# Patient Record
Sex: FEMALE | Race: ASIAN | Employment: PART TIME | URBAN - METROPOLITAN AREA
[De-identification: names, ages, dates, MRNs, and addresses within clinical notes are randomized per-mention and may not be internally consistent; named-entity substitution may affect disease eponyms.]

---

## 2017-10-20 ENCOUNTER — OFFICE VISIT (OUTPATIENT)
Dept: INTERNAL MEDICINE CLINIC | Age: 57
End: 2017-10-20

## 2017-10-20 ENCOUNTER — HOSPITAL ENCOUNTER (OUTPATIENT)
Dept: GENERAL RADIOLOGY | Age: 57
Discharge: HOME | End: 2017-10-20
Payer: COMMERCIAL

## 2017-10-20 VITALS
RESPIRATION RATE: 18 BRPM | OXYGEN SATURATION: 97 % | HEIGHT: 64 IN | DIASTOLIC BLOOD PRESSURE: 58 MMHG | WEIGHT: 147 LBS | TEMPERATURE: 98.4 F | SYSTOLIC BLOOD PRESSURE: 135 MMHG | BODY MASS INDEX: 25.1 KG/M2 | HEART RATE: 60 BPM

## 2017-10-20 DIAGNOSIS — R51.9 FREQUENT HEADACHES: ICD-10-CM

## 2017-10-20 DIAGNOSIS — M25.511 CHRONIC RIGHT SHOULDER PAIN: ICD-10-CM

## 2017-10-20 DIAGNOSIS — E78.00 HYPERCHOLESTEREMIA: ICD-10-CM

## 2017-10-20 DIAGNOSIS — Z13.228 SCREENING FOR ENDOCRINE, NUTRITIONAL, METABOLIC AND IMMUNITY DISORDER: ICD-10-CM

## 2017-10-20 DIAGNOSIS — Z71.84 COUNSELING ABOUT TRAVEL: ICD-10-CM

## 2017-10-20 DIAGNOSIS — M54.2 CERVICALGIA: ICD-10-CM

## 2017-10-20 DIAGNOSIS — M54.2 CERVICALGIA: Primary | ICD-10-CM

## 2017-10-20 DIAGNOSIS — Z13.29 SCREENING FOR ENDOCRINE, NUTRITIONAL, METABOLIC AND IMMUNITY DISORDER: ICD-10-CM

## 2017-10-20 DIAGNOSIS — G89.29 CHRONIC RIGHT SHOULDER PAIN: ICD-10-CM

## 2017-10-20 DIAGNOSIS — Z13.21 SCREENING FOR ENDOCRINE, NUTRITIONAL, METABOLIC AND IMMUNITY DISORDER: ICD-10-CM

## 2017-10-20 DIAGNOSIS — Z11.59 ENCOUNTER FOR HEPATITIS C SCREENING TEST FOR LOW RISK PATIENT: ICD-10-CM

## 2017-10-20 DIAGNOSIS — Z13.0 SCREENING FOR ENDOCRINE, NUTRITIONAL, METABOLIC AND IMMUNITY DISORDER: ICD-10-CM

## 2017-10-20 DIAGNOSIS — I10 HTN (HYPERTENSION), BENIGN: ICD-10-CM

## 2017-10-20 PROCEDURE — 73030 X-RAY EXAM OF SHOULDER: CPT

## 2017-10-20 PROCEDURE — 72050 X-RAY EXAM NECK SPINE 4/5VWS: CPT

## 2017-10-20 RX ORDER — DICLOFENAC SODIUM 10 MG/G
2 GEL TOPICAL EVERY 6 HOURS
Qty: 100 G | Refills: 2 | Status: SHIPPED | OUTPATIENT
Start: 2017-10-20 | End: 2019-12-13 | Stop reason: ALTCHOICE

## 2017-10-20 RX ORDER — ATOVAQUONE AND PROGUANIL HYDROCHLORIDE 250; 100 MG/1; MG/1
TABLET, FILM COATED ORAL
Qty: 2 TAB | Refills: 0 | Status: SHIPPED | OUTPATIENT
Start: 2017-10-20 | End: 2019-12-13 | Stop reason: ALTCHOICE

## 2017-10-20 NOTE — PROGRESS NOTES
1. Have you been to the ER, urgent care clinic since your last visit? Hospitalized since your last visit? No    2. Have you seen or consulted any other health care providers outside of the [address] [place] [address] since your last visit? Include any pap smears or colon screening.  No     Pt is here for   Chief Complaint   Patient presents with    New Patient     pt is here to establish care    Hypertension    Immunization/Injection     pt states she's going to the [facility] and needs the necessary immunizations for this sent to [facility]          Pt denies pain at this time

## 2017-10-20 NOTE — MR AVS SNAPSHOT
Visit Information Date & Time Provider Department Dept. Phone Encounter #  
 10/20/2017  9:40 AM Gerhard Garcia NP [id number] [phone] [id number] Follow-up Instructions Return in about 4 weeks (around 11/17/2017) for htn, NO MED. Your Appointments 11/17/2017 10:20 AM  
ROUTINE CARE with Gerhard Garcia NP  
[facility] Note: htn , No med [facility] [id number]  
[phone]  
  
   
 [address] [place] Upcoming Health Maintenance Date Due  
 PAP AKA CERVICAL CYTOLOGY 11/19/2017* FOBT Q 1 YEAR AGE 50-75 11/19/2017* BREAST CANCER SCRN MAMMOGRAM 10/20/2019 DTaP/Tdap/Td series (2 - Td) 10/20/2027 *Topic was postponed. The date shown is not the original due date. Allergies as of 10/20/2017  Review Complete On: 10/20/2017 By: Zainab Maloney. TRENT Yuen No Known Allergies Current Immunizations  Reviewed on 2/27/2014 No immunizations on file. Not reviewed this visit You Were Diagnosed With   
  
 Codes Comments Cervicalgia    -  Primary ICD-10-CM: M54.2 ICD-9-CM: 723.1 HTN (hypertension), benign     ICD-10-CM: I10 
ICD-9-CM: 401.1 Hypercholesteremia     ICD-10-CM: E78.00 ICD-9-CM: 272.0 Frequent headaches     ICD-10-CM: R51 ICD-9-CM: 784.0 Screening for endocrine, nutritional, metabolic and immunity disorder     ICD-10-CM: Z13.29, Z13.21, Z13.228, Z13.0 ICD-9-CM: V77.99 Chronic right shoulder pain     ICD-10-CM: M25.511, G89.29 ICD-9-CM: 719.41, 338.29 Encounter for hepatitis C screening test for low risk patient     ICD-10-CM: Z11.59 
ICD-9-CM: V73.89 Vitals BP Pulse Temp Resp Height(growth percentile) Weight(growth percentile) 135/58 (BP 1 Location: Right arm, BP Patient Position: Sitting) 60 98.4 °F (36.9 °C) (Oral) 18 5' 4\" (1.626 m) 147 lb (66.7 kg) SpO2 BMI OB Status Smoking Status 97% 25.23 kg/m2 Menopause Never Smoker Vitals History BMI and BSA Data Body Mass Index Body Surface Area  
 25.23 kg/m 2 1.74 m 2 Preferred Pharmacy Pharmacy Name Phone [facility] [address] [place] [address], [address] [place] [address],[address] [phone] Your Updated Medication List  
  
   
This list is accurate as of: 10/20/17 10:30 AM.  Always use your most recent med list.  
  
  
  
  
 aspirin 81 mg chewable tablet Take 81 mg by mouth daily. diclofenac 1 % Gel Commonly known as:  VOLTAREN Apply 2 g to affected area every six (6) hours. multivitamin tablet Commonly known as:  ONE A DAY Take 1 Tab by mouth daily. VITAMIN C 500 mg tablet Generic drug:  ascorbic acid (vitamin C) Take 500 mg by mouth daily. Prescriptions Sent to Pharmacy Refills  
 diclofenac (VOLTAREN) 1 % gel 2 Sig: Apply 2 g to affected area every six (6) hours. Class: Normal  
 Pharmacy: [id number] [place] [address], [address] [place] [address],[address] Ph #: [phone] Route: Topical  
  
We Performed the Following CBC WITH AUTOMATED DIFF [16136 CPT(R)] HEPATITIS PANEL, ACUTE [17891 CPT(R)] LIPID PANEL [42608 CPT(R)] METABOLIC PANEL, COMPREHENSIVE [62217 CPT(R)] TSH 3RD GENERATION [65500 CPT(R)] VITAMIN D, 25 HYDROXY [id number] CPT(R)] Follow-up Instructions Return in about 4 weeks (around 11/17/2017) for htn, NO MED. To-Do List   
 10/20/2017 Imaging:  XR SHOULDER RT AP/LAT MIN 2 V   
  
 10/20/2017 Imaging:  XR SPINE CERV 4 OR 5 V Patient Instructions Also try GLUCOSAMINE with CHONDROITIN to help with your arthritis. Learning About Colonoscopy What is a colonoscopy? A colonoscopy is a test (also called a procedure) that lets a doctor look inside your large intestine.  The doctor uses a thin, lighted tube called a colonoscope. The doctor uses it to look for small growths called polyps, colon or rectal cancer (colorectal cancer), or other problems like bleeding. During the procedure, the doctor can take samples of tissue. The samples can then be checked for cancer or other conditions. The doctor can also take out polyps. How is colonoscopy done? This procedure is done in a doctor's office or a clinic or hospital. You will get medicine to help you relax and not feel pain. Some people find that they do not remember having the test because of the medicine. The doctor gently moves the colonoscope, or scope, through the colon. The scope is also a small video camera. It lets the doctor see the colon and take pictures. A colonoscopy usually takes 30 to 45 minutes. It may take longer if the doctor has to remove polyps. How do you prepare for the procedure? You need to clean out your colon before the procedure so the doctor can see all of your colon. You may start the cleaning process a day or two before the test. This depends on which \"colon prep\" your doctor recommends. To clean your colon, you stop eating solid foods and drink only clear liquids. You can have water, tea, coffee, clear juices, clear broths, flavored ice pops, and gelatin (such as Jell-O). Do not drink anything red or purple, such as grape juice or fruit punch. And do not eat red or purple foods, such as grape ice pops or cherry gelatin. The day or night before the procedure, you drink a large amount of a special liquid. This causes loose, frequent stools. You will go to the bathroom a lot. It is very important to drink all of the colon prep liquid. If you have problems drinking the liquid, call your doctor. For many people, the prep is worse than the test. It may be uncomfortable, and you may feel hungry on the clear liquid diet. Some people do not go to work or do their usual activities on the day of the prep. Arrange to have someone take you home after the test. 
What can you expect after a colonoscopy? The nurses will watch you for 1 to 2 hours until the medicines wear off. Then you can go home. You will need a ride. Your doctor will tell you when you can eat and do your usual activities. Your doctor will talk to you about when you will need your next colonoscopy. The results of your test and your risk for colorectal cancer will help your doctor decide how often you need to be checked. Follow-up care is a key part of your treatment and safety. Be sure to make and go to all appointments, and call your doctor if you are having problems. It's also a good idea to know your test results and keep a list of the medicines you take. Where can you learn more? Go to http://tommy-manolo.info/. Enter R305 in the search box to learn more about \"Learning About Colonoscopy. \" Current as of: July 26, 2016 Content Version: 11.3 © 9544-9428 Vertical Acuity. Care instructions adapted under license by Softheon (which disclaims liability or warranty for this information). If you have questions about a medical condition or this instruction, always ask your healthcare professional. [place] [address] any warranty or liability for your use of this information. Mammogram: About This Test 
What is it? A mammogram is an X-ray of the breast that is used to screen for breast cancer. This test can find tumors that are too small for you or your doctor to feel. Cancer is most easily treated and cured when it is found at an early stage. Why is this test done? A mammogram is done to: 
· Look for breast cancer in women who don't have symptoms. · Find breast cancer in women who have symptoms.  Symptoms of breast cancer may include a lump or thickening in the breast, nipple discharge, or dimpling of the skin on one area of the breast. 
 · Find an area of suspicious breast tissue to remove for an exam under a microscope (biopsy). How can you prepare for the test? 
· Tell your doctor if you: ¨ Are or might be pregnant. ¨ Are breastfeeding. ¨ Have breast implants. ¨ Have previously had a breast biopsy. · On the day of the test, don't use any deodorant, perfume, powders, or ointments. What happens before the test? 
· You will need to take off any jewelry that might interfere with the X-ray pictures. · You will need to take off your clothes above the waist. 
· You will be given a cloth or paper gown to use during the test. 
What happens during the test? 
· You usually stand during a mammogram. 
· One at a time, your breasts will be placed on a flat plate that contains the X-ray film. · Another plate is then pressed firmly against your breast to help flatten out the breast tissue. You may be asked to lift your arm. · For a few seconds while the X-ray picture is being taken, you will need to hold your breath. · At least two pictures are taken of each breast. One is taken from the top and one from the side. What else should you know about the test? 
· The X-ray plate will feel cold when you place your breast on it. Having your breasts flattened and squeezed isn't comfortable. But it is necessary to flatten out the breast tissue to get the best pictures. · Mammograms do not prevent breast cancer or reduce a woman's risk of developing cancer. · Most things that are found during a mammogram are not breast cancer. How long does the test take? · The test will take about 10 to 15 minutes. You may be in the clinic for up to an hour. What happens after the test? 
· You will probably be able to go home right away. · You can go back to your usual activities right away. Follow-up care is a key part of your treatment and safety.  Be sure to make and go to all appointments, and call your doctor if you are having problems. It's also a good idea to keep a list of the medicines you take. Ask your doctor when you can expect to have your test results. Where can you learn more? Go to http://tommy-manolo.info/. Enter G098 in the search box to learn more about \"Mammogram: About This Test.\" Current as of: July 26, 2016 Content Version: 11.3 © 2006-2017 Root Orange. Care instructions adapted under license by Hometica (which disclaims liability or warranty for this information). If you have questions about a medical condition or this instruction, always ask your healthcare professional. [place] [address] any warranty or liability for your use of this information. Neck Arthritis: Exercises Your Care Instructions Here are some examples of typical rehabilitation exercises for your condition. Start each exercise slowly. Ease off the exercise if you start to have pain. Your doctor or physical therapist will tell you when you can start these exercises and which ones will work best for you. How to do the exercises Neck stretches to the side 1. This stretch works best if you keep your shoulder down as you lean away from it. To help you remember to do this, start by relaxing your shoulders and lightly holding on to your thighs or your chair. 2. Tilt your head toward your shoulder and hold for 15 to 30 seconds. Let the weight of your head stretch your muscles. 3. Repeat 2 to 4 times toward each shoulder. Chin tuck 1. Lie on the floor with a rolled-up towel under your neck. Your head should be touching the floor. 2. Slowly bring your chin toward your chest. 
3. Hold for a count of 6, and then relax for up to 10 seconds. 4. Repeat 8 to 12 times. Active cervical rotation 1. Sit in a firm chair, or stand up straight. 2. Keeping your chin level, turn your head to the right, and hold for 15 to 30 seconds. 3. Turn your head to the left and hold for 15 to 30 seconds. 4. Repeat 2 to 4 times to each side. Shoulder blade squeeze 1. While standing, squeeze your shoulder blades together. 2. Do not raise your shoulders up as you are squeezing. 3. Hold for 6 seconds. 4. Repeat 8 to 12 times. Shoulder rolls 1. Sit comfortably with your feet shoulder-width apart. You can also do this exercise standing up. 2. Roll your shoulders up, then back, and then down in a smooth, circular motion. 3. Repeat 2 to 4 times. Follow-up care is a key part of your treatment and safety. Be sure to make and go to all appointments, and call your doctor if you are having problems. It's also a good idea to know your test results and keep a list of the medicines you take. Where can you learn more? Go to http://tommy-manolo.info/. Enter W913 in the search box to learn more about \"Neck Arthritis: Exercises. \" Current as of: March 21, 2017 Content Version: 11.3 © 8366-8965 Etelos. Care instructions adapted under license by Addashop (which disclaims liability or warranty for this information). If you have questions about a medical condition or this instruction, always ask your healthcare professional. [place] [address] any warranty or liability for your use of this information. Arthritis: Care Instructions Your Care Instructions Arthritis, also called osteoarthritis, is a breakdown of the cartilage that cushions your joints. When the cartilage wears down, your bones rub against each other. This causes pain and stiffness. Many people have some arthritis as they age. Arthritis most often affects the joints of the spine, hands, hips, knees, or feet. You can take simple measures to protect your joints, ease your pain, and help you stay active. Follow-up care is a key part of your treatment and safety.  Be sure to make and go to all appointments, and call your doctor if you are having problems. It's also a good idea to know your test results and keep a list of the medicines you take. How can you care for yourself at home? · Stay at a healthy weight. Being overweight puts extra strain on your joints. · Talk to your doctor or physical therapist about exercises that will help ease joint pain. ¨ Stretch. You may enjoy gentle forms of yoga to help keep your joints and muscles flexible. ¨ Walk instead of jog. Other types of exercise that are less stressful on the joints include riding a bicycle, swimming, wilian chi, or water exercise. ¨ Lift weights. Strong muscles help reduce stress on your joints. Stronger thigh muscles, for example, take some of the stress off of the knees and hips. Learn the right way to lift weights so you do not make joint pain worse. · Take your medicines exactly as prescribed. Call your doctor if you think you are having a problem with your medicine. · Take pain medicines exactly as directed. ¨ If the doctor gave you a prescription medicine for pain, take it as prescribed. ¨ If you are not taking a prescription pain medicine, ask your doctor if you can take an over-the-counter medicine. · Use a cane, crutch, walker, or another device if you need help to get around. These can help rest your joints. You also can use other things to make life easier, such as a higher toilet seat and padded handles on kitchen utensils. · Do not sit in low chairs, which can make it hard to get up. · Put heat or cold on your sore joints as needed. Use whichever helps you most. You also can take turns with hot and cold packs. ¨ Apply heat 2 or 3 times a day for 20 to 30 minutesusing a heating pad, hot shower, or hot packto relieve pain and stiffness. ¨ Put ice or a cold pack on your sore joint for 10 to 20 minutes at a time. Put a thin cloth between the ice and your skin. When should you call for help? Call your doctor now or seek immediate medical care if: 
· You have sudden swelling, warmth, or pain in any joint. · You have joint pain and a fever or rash. · You have such bad pain that you cannot use a joint. Watch closely for changes in your health, and be sure to contact your doctor if: 
· You have mild joint symptoms that continue even with more than 6 weeks of care at home. · You have stomach pain or other problems with your medicine. Where can you learn more? Go to http://tommy-manolo.info/. Enter P919 in the search box to learn more about \"Arthritis: Care Instructions. \" Current as of: November 28, 2016 Content Version: 11.3 © 3256-3082 Branded Online. Care instructions adapted under license by Prism Digital (which disclaims liability or warranty for this information). If you have questions about a medical condition or this instruction, always ask your healthcare professional. [place] [address] any warranty or liability for your use of this information. Low Sodium Diet (2,000 Milligram): Care Instructions Your Care Instructions Too much sodium causes your body to hold on to extra water. This can raise your blood pressure and force your heart and kidneys to work harder. In very serious cases, this could cause you to be put in the hospital. It might even be life-threatening. By limiting sodium, you will feel better and lower your risk of serious problems. The most common source of sodium is salt. People get most of the salt in their diet from canned, prepared, and packaged foods. Fast food and restaurant meals also are very high in sodium. Your doctor will probably limit your sodium to less than 2,000 milligrams (mg) a day. This limit counts all the sodium in prepared and packaged foods and any salt you add to your food. Follow-up care is a key part of your treatment and safety.  Be sure to make and go to all appointments, and call your doctor if you are having problems. It's also a good idea to know your test results and keep a list of the medicines you take. How can you care for yourself at home? Read food labels · Read labels on cans and food packages. The labels tell you how much sodium is in each serving. Make sure that you look at the serving size. If you eat more than the serving size, you have eaten more sodium. · Food labels also tell you the Percent Daily Value for sodium. Choose products with low Percent Daily Values for sodium. · Be aware that sodium can come in forms other than salt, including monosodium glutamate (MSG), sodium citrate, and sodium bicarbonate (baking soda). MSG is often added to Asian food. When you eat out, you can sometimes ask for food without MSG or added salt. Buy low-sodium foods · Buy foods that are labeled \"unsalted\" (no salt added), \"sodium-free\" (less than 5 mg of sodium per serving), or \"low-sodium\" (less than 140 mg of sodium per serving). Foods labeled \"reduced-sodium\" and \"light sodium\" may still have too much sodium. Be sure to read the label to see how much sodium you are getting. · Buy fresh vegetables, or frozen vegetables without added sauces. Buy low-sodium versions of canned vegetables, soups, and other canned goods. Prepare low-sodium meals · Cut back on the amount of salt you use in cooking. This will help you adjust to the taste. Do not add salt after cooking. One teaspoon of salt has about 2,300 mg of sodium. · Take the salt shaker off the table. · Flavor your food with garlic, lemon juice, onion, vinegar, herbs, and spices. Do not use soy sauce, lite soy sauce, steak sauce, onion salt, garlic salt, celery salt, mustard, or ketchup on your food. · Use low-sodium salad dressings, sauces, and ketchup. Or make your own salad dressings and sauces without adding salt. · Use less salt (or none) when recipes call for it.  You can often use half the salt a recipe calls for without losing flavor. Other foods such as rice, pasta, and grains do not need added salt. · Rinse canned vegetables, and cook them in fresh water. This removes somebut not allof the salt. · Avoid water that is naturally high in sodium or that has been treated with water softeners, which add sodium. Call your local water company to find out the sodium content of your water supply. If you buy bottled water, read the label and choose a sodium-free brand. Avoid high-sodium foods · Avoid eating: ¨ Smoked, cured, salted, and canned meat, fish, and poultry. ¨ Ham, lazcano, hot dogs, and luncheon meats. ¨ Regular, hard, and processed cheese and regular peanut butter. ¨ Crackers with salted tops, and other salted snack foods such as pretzels, chips, and salted popcorn. ¨ Frozen prepared meals, unless labeled low-sodium. ¨ Canned and dried soups, broths, and bouillon, unless labeled sodium-free or low-sodium. ¨ Canned vegetables, unless labeled sodium-free or low-sodium. ¨ Western Sangita fries, pizza, tacos, and other fast foods. ¨ Pickles, olives, ketchup, and other condiments, especially soy sauce, unless labeled sodium-free or low-sodium. Where can you learn more? Go to http://tommy-manolo.info/. Enter Y420 in the search box to learn more about \"Low Sodium Diet (2,000 Milligram): Care Instructions. \" Current as of: July 26, 2016 Content Version: 11.3 © 6750-5017 PFSweb. Care instructions adapted under license by Number 100 (which disclaims liability or warranty for this information). If you have questions about a medical condition or this instruction, always ask your healthcare professional. [address] any warranty or liability for your use of this information. Diclofenac (On the skin) Diclofenac (dye-KLOE-fen-ak) Treats actinic keratoses. Also treats pain and swelling caused by arthritis. This is an NSAID. Brand Name(s): DS Prep Derrell, DST Plus Derrell, DermacinRx [facility] Varco, Diclo Gel, Diclofex DC, Diclozor, Inflamma-K, Klofensaid II, Lexixryl, Lorvatus, NuDiclo SoluPAK, Pennsaid, Solaraze, Sure Result DSS Premium Pack, Voltaren Gel There may be other brand names for this medicine. When This Medicine Should Not Be Used: This medicine is not right for everyone. Do not use it if you had an allergic reaction to diclofenac, aspirin or another NSAID medication. How to Use This Medicine:  
Gel/Jelly, Liquid · Use your medicine as directed. There are several brands of this medicine. Make sure you understand how to use the brand you have been prescribed. Ask your doctor if you have any questions. · The Voltaren® gel comes with a dosing card to measure the correct dose. If you do not receive or misplace your dosing card, call your pharmacist to ask for a new one. · Use this medicine only on your skin. Rinse it off right away if it gets on a cut or scrape. Do not get the medicine in your eyes, nose, or mouth. · Wash your hands with soap and water before and after you use this medicine. · Apply a thin layer of the medicine to the affected area. Rub it in gently. · Do not shower, bathe, or wash the affected area for at least 30 minutes after you apply Pennsaid® or Solaraze® or 1 hour after you apply Voltaren®. · Wait until the medicine dries before you cover the treated skin with gloves or clothing. Do not let the treated skin touch any other person's skin until the medicine is completely dry. · Do not use external heat or bandages on the treated skin or joint. · This medicine should come with a Medication Guide. Ask your pharmacist for a copy if you do not have one. · Missed dose: Apply a dose as soon as you can. If it is almost time for your next dose, wait until then and apply a regular dose. Do not apply extra medicine to make up for a missed dose. · Store the medicine in a closed container at room temperature, away from heat, moisture, and direct light. Drugs and Foods to Avoid: Ask your doctor or pharmacist before using any other medicine, including over-the-counter medicines, vitamins, and herbal products. · Do not use any other NSAID unless your doctor says it is okay. Some other NSAIDs are aspirin, diflunisal, ibuprofen, naproxen, or salsalate. · Some foods and medicines can affect how diclofenac works. Tell your doctor if you are using any of the following: ¨ Acetaminophen, cyclosporine, digoxin, lithium, methotrexate, pemetrexed ¨ Blood pressure medicine ¨ Blood thinner (including warfarin) ¨ Diuretic (water pill) ¨ Medicine to treat depression ¨ Steroids (including dexamethasone, hydrocortisone, methylprednisolone, prednisolone, prednisone) · Do not put cosmetics or skin care products on the treated skin. You may use sunscreen, insect repellant, lotion, or other topical medicines after using Pennsaid®. However, wait until the medicine is completely dry before you apply anything else. Warnings While Using This Medicine: · Tell your doctor if you are pregnant. It is not safe to use this medicine during the later part of pregnancy. · Tell your doctor if you are breastfeeding, or if you have kidney disease, liver disease, asthma, bleeding problems, heart failure, high blood pressure, other heart or blood vessel problems, a recent heart attack, or a history of stomach ulcers or bleeding. Tell your doctor if you drink alcohol. · This medicine may cause the following problems: 
¨ Higher risk of blood clot, heart attack, heart failure, or stroke ¨ Bleeding in your stomach or intestines ¨ Liver problems ¨ Kidney problems and high potassium levels ¨ Serious skin reactions · This medicine may make your skin more sensitive to sunlight. Wear sunscreen. Do not use sunlamps or tanning beds. · Your doctor will do lab tests at regular visits to check on the effects of this medicine. Keep all appointments. · Keep all medicine out of the reach of children. Never share your medicine with anyone. Possible Side Effects While Using This Medicine:  
Call your doctor right away if you notice any of these side effects: · Allergic reaction: Itching or hives, swelling in your face or hands, swelling or tingling in your mouth or throat, chest tightness, trouble breathing · Blistering, peeling, or red skin rash · Bloody or black, tarry stools, severe stomach pain, vomiting blood or material that looks like coffee grounds · Change in how much or how often you urinate · Chest pain that may spread to your arms, jaw, back, or neck, trouble breathing, unusual sweating, faintness · Dark urine or pale stools, nausea, vomiting, loss of appetite, stomach pain, yellow skin or eyes · Numbness or weakness in your arm or leg, or on one side of your body, pain in your lower leg, sudden or severe headache, or problems with vision, speech, or walking · Rapid weight gain, swelling in your hands, ankles, or feet · Unusual bleeding, bruising, or weakness If you notice these less serious side effects, talk with your doctor: · Dry, flaky, or scaly skin · Mild headache · Mild skin rash, itching, or redness If you notice other side effects that you think are caused by this medicine, tell your doctor. Call your doctor for medical advice about side effects. You may report side effects to FDA at 3-738-KFE-8993 © 2017 [facility] Information is for End User's use only and may not be sold, redistributed or otherwise used for commercial purposes. The above information is an  only. It is not intended as medical advice for individual conditions or treatments. Talk to your doctor, nurse or pharmacist before following any medical regimen to see if it is safe and effective for you. Introducing [facility]! Florecita Tavares introduces [employer] patient portal. Now you can access parts of your medical record, email your doctor's office, and request medication refills online. 1. In your internet browser, go to [email or web address] 2. Click on the First Time User? Click Here link in the Sign In box. You will see the New Member Sign Up page. 3. Enter your [employer] Access Code exactly as it appears below. You will not need to use this code after youve completed the sign-up process. If you do not sign up before the expiration date, you must request a new code. · [employer] Access Code: [email or web address] Expires: 1/18/2018  9:32 AM 
 
4. Enter the last four digits of your Social Security Number (xxxx) and Date of Birth (mm/dd/yyyy) as indicated and click Submit. You will be taken to the next sign-up page. 5. Create a [employer] ID. This will be your [employer] login ID and cannot be changed, so think of one that is secure and easy to remember. 6. Create a [employer] password. You can change your password at any time. 7. Enter your Password Reset Question and Answer. This can be used at a later time if you forget your password. 8. Enter your e-mail address. You will receive e-mail notification when new information is available in [address] 9. Click Sign Up. You can now view and download portions of your medical record. 10. Click the Download Summary menu link to download a portable copy of your medical information. If you have questions, please visit the Frequently Asked Questions section of the [employer] website. Remember, [employer] is NOT to be used for urgent needs. For medical emergencies, dial 911. Now available from your iPhone and Android! Please provide this summary of care documentation to your next provider. Your primary care clinician is listed as Phys Other. If you have any questions after today's visit, please call [phone].

## 2017-10-20 NOTE — PROGRESS NOTES
Garrett Bennett is a 59 y.o. female and presents with New Patient (pt is here to establish care); Hypertension; and Immunization/Injection (pt states she's going to the [facility] and needs the necessary immunizations for this sent to [facility])    Subjective:  Pt here to establish care,  is a pt here also. Concerned for pain in neck, head, and shoulder. Ongoing for years, however more persistent lately. No fall or head injury reported. Denies numbness, tingling,and blurring vision. Alleviated some with time and use of OTC pain meds. Worse in the morning upon waking with stiffness. Annell Lisa on right shoulder many years ago with reported dislocation and has issues with right shoulder intermittently with overhead reaching since. No imaging in past for neck or shoulder. Hypertension Review:  The patient has hypertension  Diet and Lifestyle: generally does try to follow a  low sodium diet, exercises sporadically   Home BP Monitoring: is not measured at home. Pertinent ROS: taking medications as instructed, but always AFTER work (so evenings). Interested in stopping med and concerned it is cause for shoulder and neck pain. no medication side effects noted. No TIA's, chest pain on exertion, dyspnea on exertion, or swelling of ankles. BP Readings from Last 3 Encounters:   10/20/17 135/58   02/27/14 112/44     Lastly, she is traveling to the [address] soon (back home) and would like travel vaccines sent to pharmacy.      Review of Systems  Constitutional: negative for fevers, chills, anorexia and weight loss  Eyes:   negative for visual disturbance, drainage, and irritation  ENT:   negative for tinnitus,sore throat,nasal congestion,ear pain,and hoarseness  Respiratory:  negative for cough, hemoptysis, dyspnea, and wheezing  CV:   negative for chest pain, palpitations, and lower extremity edema  GI:   negative for nausea, vomiting, diarrhea, abdominal pain, and melena  Endo:               negative for polyuria,polydipsia,polyphagia, and heat intolerance  Genitourinary: negative for frequency, urgency, dysuria, retention, and hematuria  Integument:  negative for rash, ulcerations, and pruritus  Hematologic:  negative for easy bruising and bleeding  Musculoskel: negative for muscle weakness,and joint pain/swelling  Neurological:  negative for headaches, dizziness, vertigo,and memory/gait problems  Behavl/Psych: negative for feelings of anxiety, depression, suicide, and mood changes    Past Medical History:   Diagnosis Date    HX OTHER MEDICAL     high colesterol    Hypertension      History reviewed. No pertinent surgical history. Social History     Social History    Marital status:      Spouse name: N/A    Number of children: N/A    Years of education: N/A     Social History Main Topics    Smoking status: Never Smoker    Smokeless tobacco: Never Used    Alcohol use Yes      Comment: occasionaly    Drug use: None    Sexual activity: Not Asked     Other Topics Concern    None     Social History Narrative     History reviewed. No pertinent family history. Current Outpatient Prescriptions   Medication Sig Dispense Refill    diclofenac (VOLTAREN) 1 % gel Apply 2 g to affected area every six (6) hours. 100 g 2    typhoid vaccin,live,attenuated (VIVOTIF) 2 billion unit SR capsule Take 1 hour before a meal with cold or luke-warm drink (not HOT) on days 1, 3, 5, and 7. Complete at least 1 week prior to travel 4 Cap 0    atovaquone-proguanil (MALARONE) 250-100 mg per tablet Take 1 tab DAILY, 1-2 days BEFORE traveling to exposed area. Do not take within 72 hours of Typhoid vaccine  Indications: ANTI-MALARIAL 2 Tab 0    cholera vaccine, live [id number] to 2x 10exp9 CF unit susr Take 100 mL by mouth once for 1 dose. More than 10 days prior to potential cholera exposure. 100 mL 0    hepatitis A and B vaccine, PF, (TWINRIX) syringe 1 mL by IntraMUSCular route once for 1 dose.  Indications: HEPATITIS B PREVENTION, VIRAL HEPATITIS A PREVENTION 1 mL 0    aspirin 81 mg chewable tablet Take 81 mg by mouth daily.  multivitamin (ONE A DAY) tablet Take 1 Tab by mouth daily.  ascorbic acid (VITAMIN C) 500 mg tablet Take 500 mg by mouth daily. No Known Allergies    Objective:  Visit Vitals    /58 (BP 1 Location: Right arm, BP Patient Position: Sitting)    Pulse 60    Temp 98.4 °F (36.9 °C) (Oral)    Resp 18    Ht 5' 4\" (1.626 m)    Wt 147 lb (66.7 kg)    SpO2 97%    BMI 25.23 kg/m2     Wt Readings from Last 3 Encounters:   10/20/17 147 lb (66.7 kg)   02/27/14 143 lb 12.8 oz (65.2 kg)     Physical Exam:   General appearance - alert, well appearing, and in no distress. Mental status - A/O x 4, normal mood and affect. Neck -Supple ,normal CSP. FROM, non-tender, but right trapezius with prominence (muscle knot). No JVD. Chest - CTA. Symmetric chest rise. No wheezing, rales or rhonchi. Heart - Normal rate, regular rhythm. Normal S1, S2. No MGR or clicks. Abdomen - Soft,non-distended. Normoactive BS in all quadrants. NT, no mass or HSM. Ext- Radial, DP pulses, 2+ bilaterally. No pedal edema, clubbing, or cyanosis. Skin-Warm and dry. No hyperpigmentation, ulcerations, or suspicious lesions. Neuro - Normal speech, no focal findings or movement disorder. Normal strength, gait, and muscle tone. Assessment/Plan:  Xray of neck and right shoulder ordered. Advised pt try massage therapy and heat/topical agents. INI diclofenac gel prescribed for use. Also suggested use of glucosamine/chondroitin to help. HOLDING atenolol as BP today is unmedicated, will monitor. If >140/90 will continue WITHOUT medication. I spent greater than 50% of 45 minute visit counseling patient about diagnostic results, impressions, prognosis, risk/benefits of treatment options, medication management and adequate follow-up, importance of adherence to treatment plan, and risk factor reduction.   Medication Side Effects and Warnings were discussed with patient: yes   Patient Labs were reviewed: yes  Patient Past Records were reviewed: yes    See below for other orders   Follow-up Disposition:  Return in about 4 weeks (around 11/17/2017) for htn, NO MED. Pt has given consent verbally while in office for [employer] Text messaging. ICD-10-CM ICD-9-CM    1. Cervicalgia M54.2 723.1 XR SPINE CERV 4 OR 5 V      diclofenac (VOLTAREN) 1 % gel   2. HTN (hypertension), benign I10 401.1    3. Hypercholesteremia E78.00 272.0 LIPID PANEL   4. Frequent headaches R51 784.0    5. Screening for endocrine, nutritional, metabolic and immunity disorder C89.14 O00.68 METABOLIC PANEL, COMPREHENSIVE    Z13.21  CBC WITH AUTOMATED DIFF    Z13.228  TSH 3RD GENERATION    Z13.0  VITAMIN D, 25 HYDROXY   6. Chronic right shoulder pain M25.511 719.41 XR SHOULDER RT AP/LAT MIN 2 V    G89.29 338.29    7. Encounter for hepatitis C screening test for low risk patient Z11.59 V73.89 HEPATITIS PANEL, ACUTE   8.  Counseling about travel Z71.89 V65.49 typhoid vaccin,live,attenuated (VIVOTIF) 2 billion unit SR capsule      atovaquone-proguanil (MALARONE) 250-100 mg per tablet      cholera vaccine, live [id number] to 2x 10exp9 CF unit susr      hepatitis A and B vaccine, PF, (TWINRIX) syringe     Orders Placed This Encounter    XR SPINE CERV 4 OR 5 V     Standing Status:   Future     Number of Occurrences:   1     Standing Expiration Date:   11/20/2018     Order Specific Question:   Reason for Exam     Answer:   neck pain and headaches    XR SHOULDER RT AP/LAT MIN 2 V     Standing Status:   Future     Number of Occurrences:   1     Standing Expiration Date:   11/20/2018     Scheduling Instructions:      Regina Almendarez      True lateral-scapula Y     Order Specific Question:   Reason for Exam     Answer:   right shoulder pain    LIPID PANEL    METABOLIC PANEL, COMPREHENSIVE    CBC WITH AUTOMATED DIFF    TSH 3RD GENERATION    VITAMIN D, 25 HYDROXY    HEPATITIS PANEL, ACUTE  diclofenac (VOLTAREN) 1 % gel     Sig: Apply 2 g to affected area every six (6) hours. Dispense:  100 g     Refill:  2    typhoid vaccin,live,attenuated (VIVOTIF) 2 billion unit SR capsule     Sig: Take 1 hour before a meal with cold or luke-warm drink (not HOT) on days 1, 3, 5, and 7. Complete at least 1 week prior to travel     Dispense:  4 Cap     Refill:  0    atovaquone-proguanil (MALARONE) 250-100 mg per tablet     Sig: Take 1 tab DAILY, 1-2 days BEFORE traveling to exposed area. Do not take within 72 hours of Typhoid vaccine  Indications: ANTI-MALARIAL     Dispense:  2 Tab     Refill:  0    cholera vaccine, live [id number] to 2x 10exp9 CF unit susr     Sig: Take 100 mL by mouth once for 1 dose. More than 10 days prior to potential cholera exposure. Dispense:  100 mL     Refill:  0    hepatitis A and B vaccine, PF, (TWINRIX) syringe     Si mL by IntraMUSCular route once for 1 dose. Indications: HEPATITIS B PREVENTION, VIRAL HEPATITIS A PREVENTION     Dispense:  1 mL     Refill:  0     Pharmacy to administer please. Pt traveling to  [facility]. Salma Hidalgo expressed understanding of plan. An After Visit Summary was offered/printed and given to the patient.

## 2017-10-20 NOTE — PATIENT INSTRUCTIONS
Also try GLUCOSAMINE with CHONDROITIN to help with your arthritis. Learning About Colonoscopy  What is a colonoscopy? A colonoscopy is a test (also called a procedure) that lets a doctor look inside your large intestine. The doctor uses a thin, lighted tube called a colonoscope. The doctor uses it to look for small growths called polyps, colon or rectal cancer (colorectal cancer), or other problems like bleeding. During the procedure, the doctor can take samples of tissue. The samples can then be checked for cancer or other conditions. The doctor can also take out polyps. How is colonoscopy done? This procedure is done in a doctor's office or a clinic or hospital. You will get medicine to help you relax and not feel pain. Some people find that they do not remember having the test because of the medicine. The doctor gently moves the colonoscope, or scope, through the colon. The scope is also a small video camera. It lets the doctor see the colon and take pictures. A colonoscopy usually takes 30 to 45 minutes. It may take longer if the doctor has to remove polyps. How do you prepare for the procedure? You need to clean out your colon before the procedure so the doctor can see all of your colon. You may start the cleaning process a day or two before the test. This depends on which \"colon prep\" your doctor recommends. To clean your colon, you stop eating solid foods and drink only clear liquids. You can have water, tea, coffee, clear juices, clear broths, flavored ice pops, and gelatin (such as Jell-O). Do not drink anything red or purple, such as grape juice or fruit punch. And do not eat red or purple foods, such as grape ice pops or cherry gelatin. The day or night before the procedure, you drink a large amount of a special liquid. This causes loose, frequent stools. You will go to the bathroom a lot. It is very important to drink all of the colon prep liquid.  If you have problems drinking the liquid, call your doctor. For many people, the prep is worse than the test. It may be uncomfortable, and you may feel hungry on the clear liquid diet. Some people do not go to work or do their usual activities on the day of the prep. Arrange to have someone take you home after the test.  What can you expect after a colonoscopy? The nurses will watch you for 1 to 2 hours until the medicines wear off. Then you can go home. You will need a ride. Your doctor will tell you when you can eat and do your usual activities. Your doctor will talk to you about when you will need your next colonoscopy. The results of your test and your risk for colorectal cancer will help your doctor decide how often you need to be checked. Follow-up care is a key part of your treatment and safety. Be sure to make and go to all appointments, and call your doctor if you are having problems. It's also a good idea to know your test results and keep a list of the medicines you take. Where can you learn more? Go to http://tommy-manolo.info/. Enter D424 in the search box to learn more about \"Learning About Colonoscopy. \"  Current as of: July 26, 2016  Content Version: 11.3  © 3751-6443 [employer]. Care instructions adapted under license by [employer] (which disclaims liability or warranty for this information). If you have questions about a medical condition or this instruction, always ask your healthcare professional. [place] [address] any warranty or liability for your use of this information. Mammogram: About This Test  What is it? A mammogram is an X-ray of the breast that is used to screen for breast cancer. This test can find tumors that are too small for you or your doctor to feel. Cancer is most easily treated and cured when it is found at an early stage. Why is this test done?   A mammogram is done to:  · Look for breast cancer in women who don't have symptoms. · Find breast cancer in women who have symptoms. Symptoms of breast cancer may include a lump or thickening in the breast, nipple discharge, or dimpling of the skin on one area of the breast.  · Find an area of suspicious breast tissue to remove for an exam under a microscope (biopsy). How can you prepare for the test?  · Tell your doctor if you:  ¨ Are or might be pregnant. ¨ Are breastfeeding. ¨ Have breast implants. ¨ Have previously had a breast biopsy. · On the day of the test, don't use any deodorant, perfume, powders, or ointments. What happens before the test?  · You will need to take off any jewelry that might interfere with the X-ray pictures. · You will need to take off your clothes above the waist.  · You will be given a cloth or paper gown to use during the test.  What happens during the test?  · You usually stand during a mammogram.  · One at a time, your breasts will be placed on a flat plate that contains the X-ray film. · Another plate is then pressed firmly against your breast to help flatten out the breast tissue. You may be asked to lift your arm. · For a few seconds while the X-ray picture is being taken, you will need to hold your breath. · At least two pictures are taken of each breast. One is taken from the top and one from the side. What else should you know about the test?  · The X-ray plate will feel cold when you place your breast on it. Having your breasts flattened and squeezed isn't comfortable. But it is necessary to flatten out the breast tissue to get the best pictures. · Mammograms do not prevent breast cancer or reduce a woman's risk of developing cancer. · Most things that are found during a mammogram are not breast cancer. How long does the test take? · The test will take about 10 to 15 minutes. You may be in the clinic for up to an hour. What happens after the test?  · You will probably be able to go home right away.   · You can go back to your usual activities right away. Follow-up care is a key part of your treatment and safety. Be sure to make and go to all appointments, and call your doctor if you are having problems. It's also a good idea to keep a list of the medicines you take. Ask your doctor when you can expect to have your test results. Where can you learn more? Go to http://tommy-manolo.info/. Enter T697 in the search box to learn more about \"Mammogram: About This Test.\"  Current as of: July 26, 2016  Content Version: 11.3  © 5535-5386 [employer]. Care instructions adapted under license by [email or web address] (which disclaims liability or warranty for this information). If you have questions about a medical condition or this instruction, always ask your healthcare professional. [address] any warranty or liability for your use of this information. Neck Arthritis: Exercises  Your Care Instructions  Here are some examples of typical rehabilitation exercises for your condition. Start each exercise slowly. Ease off the exercise if you start to have pain. Your doctor or physical therapist will tell you when you can start these exercises and which ones will work best for you. How to do the exercises  Neck stretches to the side    1. This stretch works best if you keep your shoulder down as you lean away from it. To help you remember to do this, start by relaxing your shoulders and lightly holding on to your thighs or your chair. 2. Tilt your head toward your shoulder and hold for 15 to 30 seconds. Let the weight of your head stretch your muscles. 3. Repeat 2 to 4 times toward each shoulder. Chin tuck    1. Lie on the floor with a rolled-up towel under your neck. Your head should be touching the floor. 2. Slowly bring your chin toward your chest.  3. Hold for a count of 6, and then relax for up to 10 seconds. 4. Repeat 8 to 12 times. Active cervical rotation    1.  Sit in a firm chair, or stand up straight. 2. Keeping your chin level, turn your head to the right, and hold for 15 to 30 seconds. 3. Turn your head to the left and hold for 15 to 30 seconds. 4. Repeat 2 to 4 times to each side. Shoulder blade squeeze    1. While standing, squeeze your shoulder blades together. 2. Do not raise your shoulders up as you are squeezing. 3. Hold for 6 seconds. 4. Repeat 8 to 12 times. Shoulder rolls    1. Sit comfortably with your feet shoulder-width apart. You can also do this exercise standing up. 2. Roll your shoulders up, then back, and then down in a smooth, circular motion. 3. Repeat 2 to 4 times. Follow-up care is a key part of your treatment and safety. Be sure to make and go to all appointments, and call your doctor if you are having problems. It's also a good idea to know your test results and keep a list of the medicines you take. Where can you learn more? Go to http://tommy-manolo.info/. Enter L010 in the search box to learn more about \"Neck Arthritis: Exercises. \"  Current as of: March 21, 2017  Content Version: 11.3  © 6041-1001 [employer]. Care instructions adapted under license by [employer] (which disclaims liability or warranty for this information). If you have questions about a medical condition or this instruction, always ask your healthcare professional. [place] [address] any warranty or liability for your use of this information. Arthritis: Care Instructions  Your Care Instructions  Arthritis, also called osteoarthritis, is a breakdown of the cartilage that cushions your joints. When the cartilage wears down, your bones rub against each other. This causes pain and stiffness. Many people have some arthritis as they age. Arthritis most often affects the joints of the spine, hands, hips, knees, or feet.   You can take simple measures to protect your joints, ease your pain, and help you stay active. Follow-up care is a key part of your treatment and safety. Be sure to make and go to all appointments, and call your doctor if you are having problems. It's also a good idea to know your test results and keep a list of the medicines you take. How can you care for yourself at home? · Stay at a healthy weight. Being overweight puts extra strain on your joints. · Talk to your doctor or physical therapist about exercises that will help ease joint pain. ¨ Stretch. You may enjoy gentle forms of yoga to help keep your joints and muscles flexible. ¨ Walk instead of jog. Other types of exercise that are less stressful on the joints include riding a bicycle, swimming, wilian chi, or water exercise. ¨ Lift weights. Strong muscles help reduce stress on your joints. Stronger thigh muscles, for example, take some of the stress off of the knees and hips. Learn the right way to lift weights so you do not make joint pain worse. · Take your medicines exactly as prescribed. Call your doctor if you think you are having a problem with your medicine. · Take pain medicines exactly as directed. ¨ If the doctor gave you a prescription medicine for pain, take it as prescribed. ¨ If you are not taking a prescription pain medicine, ask your doctor if you can take an over-the-counter medicine. · Use a cane, crutch, walker, or another device if you need help to get around. These can help rest your joints. You also can use other things to make life easier, such as a higher toilet seat and padded handles on kitchen utensils. · Do not sit in low chairs, which can make it hard to get up. · Put heat or cold on your sore joints as needed. Use whichever helps you most. You also can take turns with hot and cold packs. ¨ Apply heat 2 or 3 times a day for 20 to 30 minutes--using a heating pad, hot shower, or hot pack--to relieve pain and stiffness. ¨ Put ice or a cold pack on your sore joint for 10 to 20 minutes at a time.  Put a thin cloth between the ice and your skin. When should you call for help? Call your doctor now or seek immediate medical care if:  · You have sudden swelling, warmth, or pain in any joint. · You have joint pain and a fever or rash. · You have such bad pain that you cannot use a joint. Watch closely for changes in your health, and be sure to contact your doctor if:  · You have mild joint symptoms that continue even with more than 6 weeks of care at home. · You have stomach pain or other problems with your medicine. Where can you learn more? Go to http://tommy-manolo.info/. Enter Y986 in the search box to learn more about \"Arthritis: Care Instructions. \"  Current as of: November 28, 2016  Content Version: 11.3  © 7843-3169 [employer]. Care instructions adapted under license by [employer] (which disclaims liability or warranty for this information). If you have questions about a medical condition or this instruction, always ask your healthcare professional. [place] [address] any warranty or liability for your use of this information. Low Sodium Diet (2,000 Milligram): Care Instructions  Your Care Instructions  Too much sodium causes your body to hold on to extra water. This can raise your blood pressure and force your heart and kidneys to work harder. In very serious cases, this could cause you to be put in the hospital. It might even be life-threatening. By limiting sodium, you will feel better and lower your risk of serious problems. The most common source of sodium is salt. People get most of the salt in their diet from canned, prepared, and packaged foods. Fast food and restaurant meals also are very high in sodium. Your doctor will probably limit your sodium to less than 2,000 milligrams (mg) a day. This limit counts all the sodium in prepared and packaged foods and any salt you add to your food.   Follow-up care is a key part of your treatment and safety. Be sure to make and go to all appointments, and call your doctor if you are having problems. It's also a good idea to know your test results and keep a list of the medicines you take. How can you care for yourself at home? Read food labels  · Read labels on cans and food packages. The labels tell you how much sodium is in each serving. Make sure that you look at the serving size. If you eat more than the serving size, you have eaten more sodium. · Food labels also tell you the Percent Daily Value for sodium. Choose products with low Percent Daily Values for sodium. · Be aware that sodium can come in forms other than salt, including monosodium glutamate (MSG), sodium citrate, and sodium bicarbonate (baking soda). MSG is often added to Asian food. When you eat out, you can sometimes ask for food without MSG or added salt. Buy low-sodium foods  · Buy foods that are labeled \"unsalted\" (no salt added), \"sodium-free\" (less than 5 mg of sodium per serving), or \"low-sodium\" (less than 140 mg of sodium per serving). Foods labeled \"reduced-sodium\" and \"light sodium\" may still have too much sodium. Be sure to read the label to see how much sodium you are getting. · Buy fresh vegetables, or frozen vegetables without added sauces. Buy low-sodium versions of canned vegetables, soups, and other canned goods. Prepare low-sodium meals  · Cut back on the amount of salt you use in cooking. This will help you adjust to the taste. Do not add salt after cooking. One teaspoon of salt has about 2,300 mg of sodium. · Take the salt shaker off the table. · Flavor your food with garlic, lemon juice, onion, vinegar, herbs, and spices. Do not use soy sauce, lite soy sauce, steak sauce, onion salt, garlic salt, celery salt, mustard, or ketchup on your food. · Use low-sodium salad dressings, sauces, and ketchup. Or make your own salad dressings and sauces without adding salt.   · Use less salt (or none) when recipes call for it. You can often use half the salt a recipe calls for without losing flavor. Other foods such as rice, pasta, and grains do not need added salt. · Rinse canned vegetables, and cook them in fresh water. This removes some--but not all--of the salt. · Avoid water that is naturally high in sodium or that has been treated with water softeners, which add sodium. Call your local water company to find out the sodium content of your water supply. If you buy bottled water, read the label and choose a sodium-free brand. Avoid high-sodium foods  · Avoid eating:  ¨ Smoked, cured, salted, and canned meat, fish, and poultry. ¨ Ham, lazcano, hot dogs, and luncheon meats. ¨ Regular, hard, and processed cheese and regular peanut butter. ¨ Crackers with salted tops, and other salted snack foods such as pretzels, chips, and salted popcorn. ¨ Frozen prepared meals, unless labeled low-sodium. ¨ Canned and dried soups, broths, and bouillon, unless labeled sodium-free or low-sodium. ¨ Canned vegetables, unless labeled sodium-free or low-sodium. ¨ Raphael Snow fries, pizza, tacos, and other fast foods. ¨ Pickles, olives, ketchup, and other condiments, especially soy sauce, unless labeled sodium-free or low-sodium. Where can you learn more? Go to http://tommy-manolo.info/. Enter H224 in the search box to learn more about \"Low Sodium Diet (2,000 Milligram): Care Instructions. \"  Current as of: July 26, 2016  Content Version: 11.3  © 4058-4455 [employer]. Care instructions adapted under license by [employer] (which disclaims liability or warranty for this information). If you have questions about a medical condition or this instruction, always ask your healthcare professional. [address]  any warranty or liability for your use of this information. Diclofenac (On the skin)   Diclofenac (dye-KLOE-fen-ak)  Treats actinic keratoses.  Also treats pain and swelling caused by arthritis. This is an NSAID. Brand Name(s): DS Prep Derrell, DST Plus Derrell, DermacinRx [facility] Varco, Diclo Gel, Diclofex DC, Diclozor, Inflamma-K, Klofensaid II, Lexixryl, Lorvatus, NuDiclo SoluPAK, Pennsaid, Solaraze, Sure Result DSS Premium Pack, Voltaren Gel   There may be other brand names for this medicine. When This Medicine Should Not Be Used: This medicine is not right for everyone. Do not use it if you had an allergic reaction to diclofenac, aspirin or another NSAID medication. How to Use This Medicine:   Gel/Jelly, Liquid  · Use your medicine as directed. There are several brands of this medicine. Make sure you understand how to use the brand you have been prescribed. Ask your doctor if you have any questions. · The Voltaren® gel comes with a dosing card to measure the correct dose. If you do not receive or misplace your dosing card, call your pharmacist to ask for a new one. · Use this medicine only on your skin. Rinse it off right away if it gets on a cut or scrape. Do not get the medicine in your eyes, nose, or mouth. · Wash your hands with soap and water before and after you use this medicine. · Apply a thin layer of the medicine to the affected area. Rub it in gently. · Do not shower, bathe, or wash the affected area for at least 30 minutes after you apply Pennsaid® or Solaraze® or 1 hour after you apply Voltaren®. · Wait until the medicine dries before you cover the treated skin with gloves or clothing. Do not let the treated skin touch any other person's skin until the medicine is completely dry. · Do not use external heat or bandages on the treated skin or joint. · This medicine should come with a Medication Guide. Ask your pharmacist for a copy if you do not have one. · Missed dose: Apply a dose as soon as you can. If it is almost time for your next dose, wait until then and apply a regular dose. Do not apply extra medicine to make up for a missed dose.   · Store the medicine in a closed container at room temperature, away from heat, moisture, and direct light. Drugs and Foods to Avoid:   Ask your doctor or pharmacist before using any other medicine, including over-the-counter medicines, vitamins, and herbal products. · Do not use any other NSAID unless your doctor says it is okay. Some other NSAIDs are aspirin, diflunisal, ibuprofen, naproxen, or salsalate. · Some foods and medicines can affect how diclofenac works. Tell your doctor if you are using any of the following:  ¨ Acetaminophen, cyclosporine, digoxin, lithium, methotrexate, pemetrexed  ¨ Blood pressure medicine  ¨ Blood thinner (including warfarin)  ¨ Diuretic (water pill)  ¨ Medicine to treat depression  ¨ Steroids (including dexamethasone, hydrocortisone, methylprednisolone, prednisolone, prednisone)  · Do not put cosmetics or skin care products on the treated skin. You may use sunscreen, insect repellant, lotion, or other topical medicines after using Pennsaid®. However, wait until the medicine is completely dry before you apply anything else. Warnings While Using This Medicine:   · Tell your doctor if you are pregnant. It is not safe to use this medicine during the later part of pregnancy. · Tell your doctor if you are breastfeeding, or if you have kidney disease, liver disease, asthma, bleeding problems, heart failure, high blood pressure, other heart or blood vessel problems, a recent heart attack, or a history of stomach ulcers or bleeding. Tell your doctor if you drink alcohol. · This medicine may cause the following problems:  ¨ Higher risk of blood clot, heart attack, heart failure, or stroke  ¨ Bleeding in your stomach or intestines  ¨ Liver problems  ¨ Kidney problems and high potassium levels  ¨ Serious skin reactions  · This medicine may make your skin more sensitive to sunlight. Wear sunscreen. Do not use sunlamps or tanning beds.   · Your doctor will do lab tests at regular visits to check on the effects of this medicine. Keep all appointments. · Keep all medicine out of the reach of children. Never share your medicine with anyone. Possible Side Effects While Using This Medicine:   Call your doctor right away if you notice any of these side effects:  · Allergic reaction: Itching or hives, swelling in your face or hands, swelling or tingling in your mouth or throat, chest tightness, trouble breathing  · Blistering, peeling, or red skin rash  · Bloody or black, tarry stools, severe stomach pain, vomiting blood or material that looks like coffee grounds  · Change in how much or how often you urinate  · Chest pain that may spread to your arms, jaw, back, or neck, trouble breathing, unusual sweating, faintness  · Dark urine or pale stools, nausea, vomiting, loss of appetite, stomach pain, yellow skin or eyes  · Numbness or weakness in your arm or leg, or on one side of your body, pain in your lower leg, sudden or severe headache, or problems with vision, speech, or walking  · Rapid weight gain, swelling in your hands, ankles, or feet  · Unusual bleeding, bruising, or weakness  If you notice these less serious side effects, talk with your doctor:   · Dry, flaky, or scaly skin  · Mild headache  · Mild skin rash, itching, or redness  If you notice other side effects that you think are caused by this medicine, tell your doctor. Call your doctor for medical advice about side effects. You may report side effects to FDA at 7-307-FDA-7010  © 2017 [address] Information is for End User's use only and may not be sold, redistributed or otherwise used for commercial purposes. The above information is an  only. It is not intended as medical advice for individual conditions or treatments. Talk to your doctor, nurse or pharmacist before following any medical regimen to see if it is safe and effective for you.

## 2017-10-21 LAB
25(OH)D3+25(OH)D2 SERPL-MCNC: 27.5 NG/ML (ref 30–100)
ALBUMIN SERPL-MCNC: 4.5 G/DL (ref 3.5–5.5)
ALBUMIN/GLOB SERPL: 1.4 {RATIO} (ref 1.2–2.2)
ALP SERPL-CCNC: 47 IU/L (ref 39–117)
ALT SERPL-CCNC: 19 IU/L (ref 0–32)
AST SERPL-CCNC: 21 IU/L (ref 0–40)
BASOPHILS # BLD AUTO: 0 X10E3/UL (ref 0–0.2)
BASOPHILS NFR BLD AUTO: 1 %
BILIRUB SERPL-MCNC: 0.5 MG/DL (ref 0–1.2)
BUN SERPL-MCNC: 12 MG/DL (ref 6–24)
BUN/CREAT SERPL: 15 (ref 9–23)
CALCIUM SERPL-MCNC: 9.2 MG/DL (ref 8.7–10.2)
CHLORIDE SERPL-SCNC: 103 MMOL/L (ref 96–106)
CHOLEST SERPL-MCNC: 259 MG/DL (ref 100–199)
CO2 SERPL-SCNC: 25 MMOL/L (ref 18–29)
CREAT SERPL-MCNC: 0.82 MG/DL (ref 0.57–1)
EOSINOPHIL # BLD AUTO: 0.2 X10E3/UL (ref 0–0.4)
EOSINOPHIL NFR BLD AUTO: 3 %
ERYTHROCYTE [DISTWIDTH] IN BLOOD BY AUTOMATED COUNT: 14 % (ref 12.3–15.4)
GFR SERPLBLD CREATININE-BSD FMLA CKD-EPI: 79 ML/MIN/1.73
GFR SERPLBLD CREATININE-BSD FMLA CKD-EPI: 91 ML/MIN/1.73
GLOBULIN SER CALC-MCNC: 3.3 G/DL (ref 1.5–4.5)
GLUCOSE SERPL-MCNC: 104 MG/DL (ref 65–99)
HAV IGM SERPL QL IA: NEGATIVE
HBV CORE IGM SERPL QL IA: NEGATIVE
HBV SURFACE AG SERPL QL IA: NEGATIVE
HCT VFR BLD AUTO: 39.9 % (ref 34–46.6)
HCV AB S/CO SERPL IA: <0.1 S/CO RATIO (ref 0–0.9)
HDLC SERPL-MCNC: 44 MG/DL
HGB BLD-MCNC: 14.1 G/DL (ref 11.1–15.9)
IMM GRANULOCYTES # BLD: 0 X10E3/UL (ref 0–0.1)
IMM GRANULOCYTES NFR BLD: 0 %
INTERPRETATION, 910389: NORMAL
LDLC SERPL CALC-MCNC: 143 MG/DL (ref 0–99)
LYMPHOCYTES # BLD AUTO: 2.2 X10E3/UL (ref 0.7–3.1)
LYMPHOCYTES NFR BLD AUTO: 39 %
MCH RBC QN AUTO: 31 PG (ref 26.6–33)
MCHC RBC AUTO-ENTMCNC: 35.3 G/DL (ref 31.5–35.7)
MCV RBC AUTO: 88 FL (ref 79–97)
MONOCYTES # BLD AUTO: 0.4 X10E3/UL (ref 0.1–0.9)
MONOCYTES NFR BLD AUTO: 7 %
NEUTROPHILS # BLD AUTO: 2.7 X10E3/UL (ref 1.4–7)
NEUTROPHILS NFR BLD AUTO: 50 %
PLATELET # BLD AUTO: 230 X10E3/UL (ref 150–379)
POTASSIUM SERPL-SCNC: 4.3 MMOL/L (ref 3.5–5.2)
PROT SERPL-MCNC: 7.8 G/DL (ref 6–8.5)
RBC # BLD AUTO: 4.55 X10E6/UL (ref 3.77–5.28)
SODIUM SERPL-SCNC: 144 MMOL/L (ref 134–144)
TRIGL SERPL-MCNC: 358 MG/DL (ref 0–149)
TSH SERPL DL<=0.005 MIU/L-ACNC: 1.14 UIU/ML (ref 0.45–4.5)
VLDLC SERPL CALC-MCNC: 72 MG/DL (ref 5–40)
WBC # BLD AUTO: 5.6 X10E3/UL (ref 3.4–10.8)

## 2017-10-31 DIAGNOSIS — E78.2 MIXED HYPERLIPIDEMIA: Primary | ICD-10-CM

## 2018-09-14 ENCOUNTER — OFFICE VISIT (OUTPATIENT)
Dept: INTERNAL MEDICINE CLINIC | Age: 58
End: 2018-09-14

## 2018-09-14 VITALS
HEIGHT: 64 IN | HEART RATE: 90 BPM | RESPIRATION RATE: 18 BRPM | DIASTOLIC BLOOD PRESSURE: 73 MMHG | TEMPERATURE: 97.7 F | SYSTOLIC BLOOD PRESSURE: 144 MMHG | OXYGEN SATURATION: 97 % | WEIGHT: 148.9 LBS | BODY MASS INDEX: 25.42 KG/M2

## 2018-09-14 DIAGNOSIS — I10 HTN (HYPERTENSION), BENIGN: ICD-10-CM

## 2018-09-14 DIAGNOSIS — E78.2 MIXED HYPERLIPIDEMIA: Primary | ICD-10-CM

## 2018-09-14 DIAGNOSIS — M47.812 SPONDYLOSIS OF CERVICAL REGION WITHOUT MYELOPATHY OR RADICULOPATHY: ICD-10-CM

## 2018-09-14 DIAGNOSIS — Z13.0 SCREENING FOR ENDOCRINE, NUTRITIONAL, METABOLIC AND IMMUNITY DISORDER: ICD-10-CM

## 2018-09-14 DIAGNOSIS — Z13.228 SCREENING FOR ENDOCRINE, NUTRITIONAL, METABOLIC AND IMMUNITY DISORDER: ICD-10-CM

## 2018-09-14 DIAGNOSIS — Z13.29 SCREENING FOR ENDOCRINE, NUTRITIONAL, METABOLIC AND IMMUNITY DISORDER: ICD-10-CM

## 2018-09-14 DIAGNOSIS — Z13.21 SCREENING FOR ENDOCRINE, NUTRITIONAL, METABOLIC AND IMMUNITY DISORDER: ICD-10-CM

## 2018-09-14 RX ORDER — ATENOLOL 50 MG/1
TABLET ORAL DAILY
COMMUNITY
End: 2019-12-13 | Stop reason: ALTCHOICE

## 2018-09-14 RX ORDER — AMLODIPINE BESYLATE 10 MG/1
TABLET ORAL DAILY
COMMUNITY
End: 2018-09-14 | Stop reason: SDUPTHER

## 2018-09-14 RX ORDER — AMLODIPINE BESYLATE 10 MG/1
10 TABLET ORAL DAILY
Qty: 90 TAB | Refills: 3 | Status: SHIPPED | OUTPATIENT
Start: 2018-09-14 | End: 2019-12-13 | Stop reason: SDUPTHER

## 2018-09-14 NOTE — PATIENT INSTRUCTIONS
Neck Arthritis: Exercises  Your Care Instructions  Here are some examples of typical rehabilitation exercises for your condition. Start each exercise slowly. Ease off the exercise if you start to have pain. Your doctor or physical therapist will tell you when you can start these exercises and which ones will work best for you. How to do the exercises  Neck stretches to the side    1. This stretch works best if you keep your shoulder down as you lean away from it. To help you remember to do this, start by relaxing your shoulders and lightly holding on to your thighs or your chair. 2. Tilt your head toward your shoulder and hold for 15 to 30 seconds. Let the weight of your head stretch your muscles. 3. Repeat 2 to 4 times toward each shoulder. Chin tuck    1. Lie on the floor with a rolled-up towel under your neck. Your head should be touching the floor. 2. Slowly bring your chin toward your chest.  3. Hold for a count of 6, and then relax for up to 10 seconds. 4. Repeat 8 to 12 times. Active cervical rotation    1. Sit in a firm chair, or stand up straight. 2. Keeping your chin level, turn your head to the right, and hold for 15 to 30 seconds. 3. Turn your head to the left and hold for 15 to 30 seconds. 4. Repeat 2 to 4 times to each side. Shoulder blade squeeze    1. While standing, squeeze your shoulder blades together. 2. Do not raise your shoulders up as you are squeezing. 3. Hold for 6 seconds. 4. Repeat 8 to 12 times. Shoulder rolls    1. Sit comfortably with your feet shoulder-width apart. You can also do this exercise standing up. 2. Roll your shoulders up, then back, and then down in a smooth, circular motion. 3. Repeat 2 to 4 times. Follow-up care is a key part of your treatment and safety. Be sure to make and go to all appointments, and call your doctor if you are having problems. It's also a good idea to know your test results and keep a list of the medicines you take.   Where can you learn more? Go to http://tommy-manolo.info/. Enter X699 in the search box to learn more about \"Neck Arthritis: Exercises. \"  Current as of: November 29, 2017  Content Version: 11.7  © 2263-7556 [employer]. Care instructions adapted under license by [employer] (which disclaims liability or warranty for this information). If you have questions about a medical condition or this instruction, always ask your healthcare professional. [address] any warranty or liability for your use of this information. High Cholesterol: Care Instructions  Your Care Instructions    Cholesterol is a type of fat in your blood. It is needed for many body functions, such as making new cells. Cholesterol is made by your body. It also comes from food you eat. High cholesterol means that you have too much of the fat in your blood. This raises your risk of a heart attack and stroke. LDL and HDL are part of your total cholesterol. LDL is the \"bad\" cholesterol. High LDL can raise your risk for heart disease, heart attack, and stroke. HDL is the \"good\" cholesterol. It helps clear bad cholesterol from the body. High HDL is linked with a lower risk of heart disease, heart attack, and stroke. Your cholesterol levels help your doctor find out your risk for having a heart attack or stroke. You and your doctor can talk about whether you need to lower your risk and what treatment is best for you. A heart-healthy lifestyle along with medicines can help lower your cholesterol and your risk. The way you choose to lower your risk will depend on how high your risk is for heart attack and stroke. It will also depend on how you feel about taking medicines. Follow-up care is a key part of your treatment and safety. Be sure to make and go to all appointments, and call your doctor if you are having problems.  It's also a good idea to know your test results and keep a list of the medicines you take. How can you care for yourself at home? · Eat a variety of foods every day. Good choices include fruits, vegetables, whole grains (like oatmeal), dried beans and peas, nuts and seeds, soy products (like tofu), and fat-free or low-fat dairy products. · Replace butter, margarine, and hydrogenated or partially hydrogenated oils with olive and canola oils. (Canola oil margarine without trans fat is fine.)  · Replace red meat with fish, poultry, and soy protein (like tofu). · Limit processed and packaged foods like chips, crackers, and cookies. · Bake, broil, or steam foods. Don't ortega them. · Be physically active. Get at least 30 minutes of exercise on most days of the week. Walking is a good choice. You also may want to do other activities, such as running, swimming, cycling, or playing tennis or team sports. · Stay at a healthy weight or lose weight by making the changes in eating and physical activity listed above. Losing just a small amount of weight, even 5 to 10 pounds, can reduce your risk for having a heart attack or stroke. · Do not smoke. When should you call for help? Watch closely for changes in your health, and be sure to contact your doctor if:    · You need help making lifestyle changes.     · You have questions about your medicine. Where can you learn more? Go to http://tommy-manolo.info/. Enter W538 in the search box to learn more about \"High Cholesterol: Care Instructions. \"  Current as of: May 10, 2017  Content Version: 11.7  © 8933-2336 [employer]. Care instructions adapted under license by [employer] (which disclaims liability or warranty for this information). If you have questions about a medical condition or this instruction, always ask your healthcare professional. [address] any warranty or liability for your use of this information.        Statins: Care Instructions  Your Care Instructions    Statins are medicines that lower your cholesterol and your risk for a heart attack and stroke. Cholesterol is a type of fat in your blood. If you have too much cholesterol, it can build up in blood vessels. This raises your risk of heart disease, heart attack, and stroke. Statins lower cholesterol by blocking how much your body makes. This prevents cholesterol from building up in your blood vessels. This is called hardening of the arteries. It is the starting point for some heart and blood flow problems, such as heart disease. Statins may also reduce inflammation around the buildup (called plaque). This can lower the risk that the plaque will break apart and lead to a heart attack or stroke. A heart-healthy lifestyle is important for lowering your risk whether you take statins or not. This includes eating healthy foods, being active, staying at a healthy weight, and not smoking. You must take statins regularly for them to work well. If you stop, your cholesterol and your risk will go back up. Examples of statins include:  · Atorvastatin (Lipitor). · Lovastatin (Mevacor). · Pravastatin (Pravachol). · Simvastatin (Zocor). Statins interact with many medicines. So tell your doctor all of the other medicines that you take. These include prescription medicines, over-the-counter medicines, dietary supplements, and herbal products. Follow-up care is a key part of your treatment and safety. Be sure to make and go to all appointments, and call your doctor if you are having problems. It's also a good idea to know your test results and keep a list of the medicines you take. How can you care for yourself at home? · Take statins exactly as your doctor tells you. High cholesterol has no symptoms. So it is easy to forget to take the pills. Try to make a system that reminds you to take them. · Do not take two or more medicines at the same time unless the doctor told you to.  Statins can interact with other medicines. · Always tell your doctor if you think you are having a side effect. If side effects are a problem with one medicine, a different one may be used. · Keep making the lifestyle changes your doctor suggests. Eat heart-healthy foods, be active, don't smoke, and stay at a healthy weight. · Talk to your doctor about avoiding grapefruit juice if you take statins. Grapefruit juice can raise the level of this medicine in your blood. This could increase side effects. When should you call for help? Watch closely for changes in your health, and be sure to contact your doctor if:    · You think you are having problems with your medicine.     · You have aches or muscle pain. Where can you learn more? Go to http://tommy-manolo.info/. Enter R358 in the search box to learn more about \"Statins: Care Instructions. \"  Current as of: May 10, 2017  Content Version: 11.7  © 5714-3121 [employer]. Care instructions adapted under license by [employer] (which disclaims liability or warranty for this information). If you have questions about a medical condition or this instruction, always ask your healthcare professional. [place] [address] any warranty or liability for your use of this information.

## 2018-09-14 NOTE — PROGRESS NOTES
Pt is here for   Chief Complaint   Patient presents with    Hypertension     follow up         Pt states pain level is a 0/10    1. Have you been to the ER, urgent care clinic since your last visit? Hospitalized since your last visit? No    2. Have you seen or consulted any other health care providers outside of the [address] [place] [address] since your last visit? Include any pap smears or colon screening.  No

## 2018-09-14 NOTE — MR AVS SNAPSHOT
[address] [place] [address] [address] [id number] 
[phone] Patient: Doni Oneil MRN: [id number] TLX:[id number]/70/1467 Visit Information Date & Time Provider Department Dept. Phone Encounter #  
 9/14/2018 11:00 AM Higinio Palacios NP [facility] [phone] [id number] Follow-up Instructions Return in about 6 months (around 3/14/2019) for HTN, CHOL. Upcoming Health Maintenance Date Due  
 PAP AKA CERVICAL CYTOLOGY 2/28/1979 FOBT Q 1 YEAR AGE 50-75 2/28/2008 ZOSTER VACCINE AGE 60> 12/28/2017 Influenza Age 5 to Adult 8/1/2018 BREAST CANCER SCRN MAMMOGRAM 10/20/2019 DTaP/Tdap/Td series (2 - Td) 10/20/2027 Allergies as of 9/14/2018  Review Complete On: 9/14/2018 By: Brent Paz. Bosher, LPN No Known Allergies Current Immunizations  Reviewed on 2/27/2014 No immunizations on file. Not reviewed this visit You Were Diagnosed With   
  
 Codes Comments Mixed hyperlipidemia    -  Primary ICD-10-CM: Y94.4 ICD-9-CM: 272.2 HTN (hypertension), benign     ICD-10-CM: I10 
ICD-9-CM: 401.1 Spondylosis of cervical region without myelopathy or radiculopathy     ICD-10-CM: M47.812 ICD-9-CM: 721.0 Screening for endocrine, nutritional, metabolic and immunity disorder     ICD-10-CM: Z13.29, Z13.21, Z13.228, Z13.0 ICD-9-CM: V77.99 Vitals BP Pulse Temp Resp Height(growth percentile) Weight(growth percentile) 144/73 (BP 1 Location: Right arm, BP Patient Position: Sitting) 90 97.7 °F (36.5 °C) (Oral) 18 5' 4\" (1.626 m) 148 lb 14.4 oz (67.5 kg) SpO2 BMI OB Status Smoking Status 97% 25.56 kg/m2 Menopause Never Smoker Vitals History BMI and BSA Data Body Mass Index Body Surface Area 25.56 kg/m 2 1.75 m 2 Preferred Pharmacy Pharmacy Name Phone [facility] [facility], [address] [place] [address],[address] [phone] Your Updated Medication List  
  
   
This list is accurate as of 9/14/18 11:31 AM.  Always use your most recent med list. amLODIPine 10 mg tablet Commonly known as:  Horris Bills Take 1 Tab by mouth daily. aspirin 81 mg chewable tablet Take 81 mg by mouth daily. atenolol 50 mg tablet Commonly known as:  TENORMIN Take  by mouth daily. atovaquone-proguanil 250-100 mg per tablet Commonly known as:  MALARONE Take 1 tab DAILY, 1-2 days BEFORE traveling to exposed area. Do not take within 72 hours of Typhoid vaccine  Indications: ANTI-MALARIAL  
  
 diclofenac 1 % Gel Commonly known as:  VOLTAREN Apply 2 g to affected area every six (6) hours. multivitamin tablet Commonly known as:  ONE A DAY Take 1 Tab by mouth daily. typhoid vaccin,live,attenuated SR capsule Commonly known as:  VIVOTIF Take 1 hour before a meal with cold or luke-warm drink (not HOT) on days 1, 3, 5, and 7. Complete at least 1 week prior to travel VITAMIN C 500 mg tablet Generic drug:  ascorbic acid (vitamin C) Take 500 mg by mouth daily. Prescriptions Sent to Pharmacy Refills  
 amLODIPine (NORVASC) 10 mg tablet 3 Sig: Take 1 Tab by mouth daily. Class: Normal  
 Pharmacy: [facility] DR MADELINE ORTIZ [address] [place] [address], [address] [place] [address],[address] [place] [address] #: [phone] Route: Oral  
  
We Performed the Following CBC W/O DIFF [13073 CPT(R)] LIPID PANEL [03392 CPT(R)] METABOLIC PANEL, BASIC [48673 CPT(R)] Follow-up Instructions Return in about 6 months (around 3/14/2019) for HTN, CHOL. Patient Instructions Neck Arthritis: Exercises Your Care Instructions Here are some examples of typical rehabilitation exercises for your condition. Start each exercise slowly. Ease off the exercise if you start to have pain.  
Your doctor or physical therapist will tell you when you can start these exercises and which ones will work best for you. How to do the exercises Neck stretches to the side 1. This stretch works best if you keep your shoulder down as you lean away from it. To help you remember to do this, start by relaxing your shoulders and lightly holding on to your thighs or your chair. 2. Tilt your head toward your shoulder and hold for 15 to 30 seconds. Let the weight of your head stretch your muscles. 3. Repeat 2 to 4 times toward each shoulder. Chin tuck 1. Lie on the floor with a rolled-up towel under your neck. Your head should be touching the floor. 2. Slowly bring your chin toward your chest. 
3. Hold for a count of 6, and then relax for up to 10 seconds. 4. Repeat 8 to 12 times. Active cervical rotation 1. Sit in a firm chair, or stand up straight. 2. Keeping your chin level, turn your head to the right, and hold for 15 to 30 seconds. 3. Turn your head to the left and hold for 15 to 30 seconds. 4. Repeat 2 to 4 times to each side. Shoulder blade squeeze 1. While standing, squeeze your shoulder blades together. 2. Do not raise your shoulders up as you are squeezing. 3. Hold for 6 seconds. 4. Repeat 8 to 12 times. Shoulder rolls 1. Sit comfortably with your feet shoulder-width apart. You can also do this exercise standing up. 2. Roll your shoulders up, then back, and then down in a smooth, circular motion. 3. Repeat 2 to 4 times. Follow-up care is a key part of your treatment and safety. Be sure to make and go to all appointments, and call your doctor if you are having problems. It's also a good idea to know your test results and keep a list of the medicines you take. Where can you learn more? Go to http://tommy-manolo.info/. Enter Y872 in the search box to learn more about \"Neck Arthritis: Exercises. \" Current as of: November 29, 2017 Content Version: 11.7 © 2418-9192 [employer].  Care instructions adapted under license by [facility] [address] (which disclaims liability or warranty for this information). If you have questions about a medical condition or this instruction, always ask your healthcare professional. [address] any warranty or liability for your use of this information. High Cholesterol: Care Instructions Your Care Instructions Cholesterol is a type of fat in your blood. It is needed for many body functions, such as making new cells. Cholesterol is made by your body. It also comes from food you eat. High cholesterol means that you have too much of the fat in your blood. This raises your risk of a heart attack and stroke. LDL and HDL are part of your total cholesterol. LDL is the \"bad\" cholesterol. High LDL can raise your risk for heart disease, heart attack, and stroke. HDL is the \"good\" cholesterol. It helps clear bad cholesterol from the body. High HDL is linked with a lower risk of heart disease, heart attack, and stroke. Your cholesterol levels help your doctor find out your risk for having a heart attack or stroke. You and your doctor can talk about whether you need to lower your risk and what treatment is best for you. A heart-healthy lifestyle along with medicines can help lower your cholesterol and your risk. The way you choose to lower your risk will depend on how high your risk is for heart attack and stroke. It will also depend on how you feel about taking medicines. Follow-up care is a key part of your treatment and safety. Be sure to make and go to all appointments, and call your doctor if you are having problems. It's also a good idea to know your test results and keep a list of the medicines you take. How can you care for yourself at home? · Eat a variety of foods every day. Good choices include fruits, vegetables, whole grains (like oatmeal), dried beans and peas, nuts and seeds, soy products (like tofu), and fat-free or low-fat dairy products. · Replace butter, margarine, and hydrogenated or partially hydrogenated oils with olive and canola oils. (Canola oil margarine without trans fat is fine.) · Replace red meat with fish, poultry, and soy protein (like tofu). · Limit processed and packaged foods like chips, crackers, and cookies. · Bake, broil, or steam foods. Don't ortega them. · Be physically active. Get at least 30 minutes of exercise on most days of the week. Walking is a good choice. You also may want to do other activities, such as running, swimming, cycling, or playing tennis or team sports. · Stay at a healthy weight or lose weight by making the changes in eating and physical activity listed above. Losing just a small amount of weight, even 5 to 10 pounds, can reduce your risk for having a heart attack or stroke. · Do not smoke. When should you call for help? Watch closely for changes in your health, and be sure to contact your doctor if: 
  · You need help making lifestyle changes.  
  · You have questions about your medicine. Where can you learn more? Go to http://tommyRevision3manolo.info/. Enter I544 in the search box to learn more about \"High Cholesterol: Care Instructions. \" Current as of: May 10, 2017 Content Version: 11.7 © 2726-6959 [employer]. Care instructions adapted under license by [employer] (which disclaims liability or warranty for this information). If you have questions about a medical condition or this instruction, always ask your healthcare professional. [place] [address] any warranty or liability for your use of this information. Statins: Care Instructions Your Care Instructions Statins are medicines that lower your cholesterol and your risk for a heart attack and stroke. Cholesterol is a type of fat in your blood. If you have too much cholesterol, it can build up in blood vessels. This raises your risk of heart disease, heart attack, and stroke. Statins lower cholesterol by blocking how much your body makes. This prevents cholesterol from building up in your blood vessels. This is called hardening of the arteries. It is the starting point for some heart and blood flow problems, such as heart disease. Statins may also reduce inflammation around the buildup (called plaque). This can lower the risk that the plaque will break apart and lead to a heart attack or stroke. A heart-healthy lifestyle is important for lowering your risk whether you take statins or not. This includes eating healthy foods, being active, staying at a healthy weight, and not smoking. You must take statins regularly for them to work well. If you stop, your cholesterol and your risk will go back up. Examples of statins include: · Atorvastatin (Lipitor). · Lovastatin (Mevacor). · Pravastatin (Pravachol). · Simvastatin (Zocor). Statins interact with many medicines. So tell your doctor all of the other medicines that you take. These include prescription medicines, over-the-counter medicines, dietary supplements, and herbal products. Follow-up care is a key part of your treatment and safety. Be sure to make and go to all appointments, and call your doctor if you are having problems. It's also a good idea to know your test results and keep a list of the medicines you take. How can you care for yourself at home? · Take statins exactly as your doctor tells you. High cholesterol has no symptoms. So it is easy to forget to take the pills. Try to make a system that reminds you to take them. · Do not take two or more medicines at the same time unless the doctor told you to. Statins can interact with other medicines. · Always tell your doctor if you think you are having a side effect. If side effects are a problem with one medicine, a different one may be used. · Keep making the lifestyle changes your doctor suggests.  Eat heart-healthy foods, be active, don't smoke, and stay at a healthy weight. · Talk to your doctor about avoiding grapefruit juice if you take statins. Grapefruit juice can raise the level of this medicine in your blood. This could increase side effects. When should you call for help? Watch closely for changes in your health, and be sure to contact your doctor if: 
  · You think you are having problems with your medicine.  
  · You have aches or muscle pain. Where can you learn more? Go to http://tommy-manolo.info/. Enter R358 in the search box to learn more about \"Statins: Care Instructions. \" Current as of: May 10, 2017 Content Version: 11.7 © 4914-3309 [employer]. Care instructions adapted under license by [employer] (which disclaims liability or warranty for this information). If you have questions about a medical condition or this instruction, always ask your healthcare professional. [address] any warranty or liability for your use of this information. Introducing [facility]! [facility] introduces Map[employer]rackit patient portal. Now you can access parts of your medical record, email your doctor's office, and request medication refills online. 1. In your internet browser, go to [email or web address] 2. Click on the First Time User? Click Here link in the Sign In box. You will see the New Member Sign Up page. 3. Enter your Map[employer]it Access Code exactly as it appears below. You will not need to use this code after youve completed the sign-up process. If you do not sign up before the expiration date, you must request a new code. · Map[employer]rackit Access Code: [email or web address] Expires: 12/13/2018 11:31 AM 
 
4. Enter the last four digits of your Social Security Number (xxxx) and Date of Birth (mm/dd/yyyy) as indicated and click Submit. You will be taken to the next sign-up page. 5. Create a [employer] ID. This will be your [employer] login ID and cannot be changed, so think of one that is secure and easy to remember. 6. Create a [employer] password. You can change your password at any time. 7. Enter your Password Reset Question and Answer. This can be used at a later time if you forget your password. 8. Enter your e-mail address. You will receive e-mail notification when new information is available in [address] 9. Click Sign Up. You can now view and download portions of your medical record. 10. Click the Download Summary menu link to download a portable copy of your medical information. If you have questions, please visit the Frequently Asked Questions section of the [employer] website. Remember, [employer] is NOT to be used for urgent needs. For medical emergencies, dial 911. Now available from your iPhone and Android! Please provide this summary of care documentation to your next provider. Your primary care clinician is listed as Phys Other. If you have any questions after today's visit, please call [phone].

## 2018-09-14 NOTE — PROGRESS NOTES
Elenita New is a 59 y.o. female and presents with Hypertension (follow up)    Subjective:  Hypertension Review:  The patient has hypertension  Diet and Lifestyle: generally does try to follow a  low sodium diet, exercises sporadically   Home BP Monitoring: is not measured at home. Pertinent ROS: taking medications as instructed, no medication side effects noted. No TIA's, chest pain on exertion, dyspnea on exertion, or swelling of ankles. BP Readings from Last 3 Encounters:   09/14/18 144/73   10/20/17 135/58   02/27/14 112/44       Dyslipidemia Review:  Patient presents for evaluation of lipids. Compliance with treatment thus far has been n/a. Denies myalgias, slurring speech, unilateral weakness, and chest pain. A repeat fasting lipid profile was done/ordered. The patient does NOT use medications that may worsen dyslipidemias (corticosteroids, progestins, anabolic steroids, diuretics, beta-blockers, amiodarone, cyclosporine, olanzapine). The patient does try to exercise regularly. The patient is NOT known to have coexisting coronary artery disease.      Review of Systems  Constitutional: negative for fevers, chills, anorexia and weight loss  Eyes:   negative for visual disturbance, drainage, and irritation  ENT:   negative for tinnitus,sore throat,nasal congestion,ear pain,and hoarseness  Respiratory:  negative for cough, hemoptysis, dyspnea, and wheezing  CV:   negative for chest pain, palpitations, and lower extremity edema  GI:   negative for nausea, vomiting, diarrhea, abdominal pain, and melena  Endo:               negative for polyuria,polydipsia,polyphagia, and heat intolerance  Genitourinary: negative for frequency, urgency, dysuria, retention, and hematuria  Integument:  negative for rash, ulcerations, and pruritus  Hematologic:  negative for easy bruising and bleeding  Musculoskel: negative for arthralgias, muscle weakness,and joint pain/swelling  Neurological:  negative for headaches, dizziness, vertigo,and memory/gait problems  Behavl/Psych: negative for feelings of anxiety, depression, suicide, and mood changes    Past Medical History:   Diagnosis Date    HX OTHER MEDICAL     high colesterol    Hypertension      History reviewed. No pertinent surgical history. Social History     Social History    Marital status:      Spouse name: N/A    Number of children: N/A    Years of education: N/A     Social History Main Topics    Smoking status: Never Smoker    Smokeless tobacco: Never Used    Alcohol use Yes      Comment: occasionaly    Drug use: No    Sexual activity: Yes     Partners: Male     Birth control/ protection: None     Other Topics Concern    None     Social History Narrative     Family History   Problem Relation Age of Onset    Family history unknown: Yes     Current Outpatient Prescriptions   Medication Sig Dispense Refill    amLODIPine (NORVASC) 10 mg tablet Take 1 Tab by mouth daily. 90 Tab 3    aspirin 81 mg chewable tablet Take 81 mg by mouth daily.  multivitamin (ONE A DAY) tablet Take 1 Tab by mouth daily.  ascorbic acid (VITAMIN C) 500 mg tablet Take 500 mg by mouth daily.  atenolol (TENORMIN) 50 mg tablet Take  by mouth daily.  diclofenac (VOLTAREN) 1 % gel Apply 2 g to affected area every six (6) hours. 100 g 2    typhoid vaccin,live,attenuated (VIVOTIF) 2 billion unit SR capsule Take 1 hour before a meal with cold or luke-warm drink (not HOT) on days 1, 3, 5, and 7. Complete at least 1 week prior to travel 4 Cap 0    atovaquone-proguanil (MALARONE) 250-100 mg per tablet Take 1 tab DAILY, 1-2 days BEFORE traveling to exposed area.  Do not take within 72 hours of Typhoid vaccine  Indications: ANTI-MALARIAL 2 Tab 0     No Known Allergies    Objective:  Visit Vitals    /73 (BP 1 Location: Right arm, BP Patient Position: Sitting)    Pulse 90    Temp 97.7 °F (36.5 °C) (Oral)    Resp 18    Ht 5' 4\" (1.626 m)    Wt 148 lb 14.4 oz (67.5 kg)    SpO2 97%    BMI 25.56 kg/m2     Wt Readings from Last 3 Encounters:   09/14/18 148 lb 14.4 oz (67.5 kg)   10/20/17 147 lb (66.7 kg)   02/27/14 143 lb 12.8 oz (65.2 kg)     Physical Exam:   General appearance - alert, well appearing, and in no distress. Mental status - A/O x 4, normal mood and affect. Neck -Supple ,normal CSP. FROM, non-tender. No significant adenopathy/thyromegaly. No JVD. Chest - CTA. Symmetric chest rise. No wheezing, rales or rhonchi. Heart - Normal rate, regular rhythm. Normal S1, S2. No MGR or clicks. Abdomen - Soft,non-distended. Normoactive BS in all quadrants. NT, no mass or HSM. Ext- Radial, DP pulses, 2+ bilaterally. No pedal edema, clubbing, or cyanosis. Skin-Warm and dry. No hyperpigmentation, ulcerations, or suspicious lesions. Neuro - Normal speech, no focal findings or movement disorder. Normal strength, gait, and muscle tone. Assessment/Plan:  Modified diet, continue NORVASC. Repeat labs ordered. Use of heat and OTC pain meds encouraged. Medication Side Effects and Warnings were discussed with patient: yes   Patient Labs were reviewed: yes  Patient Past Records were reviewed: yes    See below for other orders   Follow-up Disposition:  Return in about 6 months (around 3/14/2019) for HTN, CHOL. ICD-10-CM ICD-9-CM    1. Mixed hyperlipidemia E78.2 272.2 LIPID PANEL   2. HTN (hypertension), benign I10 401.1 amLODIPine (NORVASC) 10 mg tablet   3. Spondylosis of cervical region without myelopathy or radiculopathy M47.812 721.0    4. Screening for endocrine, nutritional, metabolic and immunity disorder Z13.29 V77.99 CBC W/O DIFF    C06.46  METABOLIC PANEL, BASIC    V91.100      Z13.0       Orders Placed This Encounter    LIPID PANEL    CBC W/O DIFF    METABOLIC PANEL, BASIC    DISCONTD: amLODIPine (NORVASC) 10 mg tablet     Sig: Take  by mouth daily.  atenolol (TENORMIN) 50 mg tablet     Sig: Take  by mouth daily.     amLODIPine (NORVASC) 10 mg tablet     Sig: Take 1 Tab by mouth daily. Dispense:  90 Tab     Refill:  3       Salma [place] expressed understanding of plan. An After Visit Summary was offered/printed and given to the patient.

## 2018-09-17 LAB
BUN SERPL-MCNC: 12 MG/DL (ref 8–27)
BUN/CREAT SERPL: 13 (ref 12–28)
CALCIUM SERPL-MCNC: 9.3 MG/DL (ref 8.7–10.3)
CHLORIDE SERPL-SCNC: 105 MMOL/L (ref 96–106)
CHOLEST SERPL-MCNC: 274 MG/DL (ref 100–199)
CO2 SERPL-SCNC: 22 MMOL/L (ref 20–29)
CREAT SERPL-MCNC: 0.91 MG/DL (ref 0.57–1)
ERYTHROCYTE [DISTWIDTH] IN BLOOD BY AUTOMATED COUNT: 13.8 % (ref 12.3–15.4)
GLUCOSE SERPL-MCNC: 114 MG/DL (ref 65–99)
HCT VFR BLD AUTO: 44.7 % (ref 34–46.6)
HDLC SERPL-MCNC: 46 MG/DL
HGB BLD-MCNC: 15.2 G/DL (ref 11.1–15.9)
INTERPRETATION, 910389: NORMAL
LDLC SERPL CALC-MCNC: 185 MG/DL (ref 0–99)
MCH RBC QN AUTO: 29.7 PG (ref 26.6–33)
MCHC RBC AUTO-ENTMCNC: 34 G/DL (ref 31.5–35.7)
MCV RBC AUTO: 87 FL (ref 79–97)
PLATELET # BLD AUTO: 262 X10E3/UL (ref 150–379)
POTASSIUM SERPL-SCNC: 4.2 MMOL/L (ref 3.5–5.2)
RBC # BLD AUTO: 5.12 X10E6/UL (ref 3.77–5.28)
SODIUM SERPL-SCNC: 142 MMOL/L (ref 134–144)
TRIGL SERPL-MCNC: 213 MG/DL (ref 0–149)
VLDLC SERPL CALC-MCNC: 43 MG/DL (ref 5–40)
WBC # BLD AUTO: 5.6 X10E3/UL (ref 3.4–10.8)

## 2018-09-18 DIAGNOSIS — E78.2 MIXED HYPERLIPIDEMIA: Primary | ICD-10-CM

## 2018-09-18 DIAGNOSIS — E78.00 HYPERCHOLESTEREMIA: ICD-10-CM

## 2018-09-18 RX ORDER — ATORVASTATIN CALCIUM 10 MG/1
10 TABLET, FILM COATED ORAL
Qty: 90 TAB | Refills: 3 | Status: SHIPPED | OUTPATIENT
Start: 2018-09-18 | End: 2019-12-13 | Stop reason: SINTOL

## 2018-09-18 NOTE — PROGRESS NOTES
She needs to be seen sooner, in December versus March. Keep the march appt also. Had to start new med and will need to f/u in 3 months after starting.

## 2019-12-13 ENCOUNTER — OFFICE VISIT (OUTPATIENT)
Dept: INTERNAL MEDICINE CLINIC | Age: 59
End: 2019-12-13

## 2019-12-13 VITALS
RESPIRATION RATE: 18 BRPM | HEIGHT: 64 IN | HEART RATE: 86 BPM | WEIGHT: 147 LBS | OXYGEN SATURATION: 96 % | DIASTOLIC BLOOD PRESSURE: 59 MMHG | TEMPERATURE: 97.4 F | BODY MASS INDEX: 25.1 KG/M2 | SYSTOLIC BLOOD PRESSURE: 128 MMHG

## 2019-12-13 DIAGNOSIS — E78.00 HYPERCHOLESTEREMIA: Primary | ICD-10-CM

## 2019-12-13 DIAGNOSIS — Z13.228 SCREENING FOR ENDOCRINE, NUTRITIONAL, METABOLIC AND IMMUNITY DISORDER: ICD-10-CM

## 2019-12-13 DIAGNOSIS — Z13.0 SCREENING FOR ENDOCRINE, NUTRITIONAL, METABOLIC AND IMMUNITY DISORDER: ICD-10-CM

## 2019-12-13 DIAGNOSIS — Z13.21 SCREENING FOR ENDOCRINE, NUTRITIONAL, METABOLIC AND IMMUNITY DISORDER: ICD-10-CM

## 2019-12-13 DIAGNOSIS — Z13.29 SCREENING FOR ENDOCRINE, NUTRITIONAL, METABOLIC AND IMMUNITY DISORDER: ICD-10-CM

## 2019-12-13 DIAGNOSIS — R60.9 EDEMA, UNSPECIFIED TYPE: ICD-10-CM

## 2019-12-13 DIAGNOSIS — R51.9 NONINTRACTABLE EPISODIC HEADACHE, UNSPECIFIED HEADACHE TYPE: ICD-10-CM

## 2019-12-13 DIAGNOSIS — I10 HTN (HYPERTENSION), BENIGN: ICD-10-CM

## 2019-12-13 RX ORDER — AMLODIPINE BESYLATE 10 MG/1
10 TABLET ORAL DAILY
Qty: 90 TAB | Refills: 1 | Status: SHIPPED | OUTPATIENT
Start: 2019-12-13 | End: 2020-08-13

## 2019-12-13 NOTE — PATIENT INSTRUCTIONS
Elevate legs above the level of your heart while at rest to help reduce leg swelling. Also use compression stockings, available in medical supply and drug stores, to reduce swelling. Learning About How to Prevent a Stroke What is a stroke? A stroke occurs when a blood vessel in the brain bursts or is blocked by a blood clot. Without blood and the oxygen it carries, part of the brain starts to die. The part of the body controlled by the damaged area of the brain can't work properly. But there are many things you can do to help lower your stroke risk. What increases your risk for stroke? A risk factor is anything that makes you more likely to have a particular health problem. Risk factors for stroke that you can treat or change include: 
· Health problems like high blood pressure, atrial fibrillation, diabetes, and high cholesterol. · Smoking. · Heavy use of alcohol. · Being overweight. · Not getting enough physical activity. Risk factors you can't change include: · Age. The risk of stroke goes up as you get older. · Race.  Americans, Native Americans, and [place] Natives have a higher risk than those of other races. · Being female. Women have a higher risk of stroke than men. · Family history of stroke. Your doctor can help you know your risk. Then you and your can doctor talk about whether you need to lower it. What can you do to prevent a stroke? · Treat any health problems you have that raise your risk. · Adopt a heart-healthy lifestyle: ? Don't smoke. If you need help quitting, talk to your doctor about stop-smoking programs and medicines. These can increase your chances of quitting for good. ? Limit alcohol to 2 drinks a day for men and 1 drink a day for women. ? Stay at a healthy weight. Lose weight if you need to. 
? If your doctor recommends it, get more exercise. Walking is a good choice. Bit by bit, increase the amount you walk every day.  Try for at least 30 minutes on most days of the week. ? Eat heart-healthy foods. These include fruits, vegetables, high-fiber foods, and fish. Choose foods that are low in sodium, saturated fat, and trans fat. · Decide with your doctor whether you will also take medicines to help lower your risk. For example, you and your doctor may decide you will take a medicine that prevents blood clots. What are the symptoms of a stroke? The brain damage from a stroke starts within minutes. That's why it's so important to know the symptoms of stroke and to act fast. Quick treatment can help limit damage to the brain so that you have fewer problems after the stroke. FAST is a simple way to remember the main symptoms of stroke. Recognizing these symptoms helps you know when to call for medical help. FAST stands for: 
· Face drooping. · Arm weakness. · Speech difficulty. · Time to call 911. Follow-up care is a key part of your treatment and safety. Be sure to make and go to all appointments, and call your doctor if you are having problems. It's also a good idea to know your test results and keep a list of the medicines you take. Where can you learn more? Go to http://tommy-manolo.info/. Enter G757 in the search box to learn more about \"Learning About How to Prevent a Stroke. \" Current as of: September 26, 2018 Content Version: 12.2 © 7893-3806 [employer]. Care instructions adapted under license by [employer] (which disclaims liability or warranty for this information). If you have questions about a medical condition or this instruction, always ask your healthcare professional. [place] [address] any warranty or liability for your use of this information. Statins: Care Instructions Your Care Instructions Statins are medicines that lower your cholesterol and your risk for a heart attack and stroke. Cholesterol is a type of fat in your blood. If you have too much cholesterol, it can build up in blood vessels. This raises your risk of heart disease, heart attack, and stroke. Statins lower cholesterol by blocking how much your body makes. This prevents cholesterol from building up in your blood vessels. This is called hardening of the arteries. It is the starting point for some heart and blood flow problems, such as heart disease. Statins may also reduce inflammation around the buildup (called plaque). This can lower the risk that the plaque will break apart and lead to a heart attack or stroke. A heart-healthy lifestyle is important for lowering your risk whether you take statins or not. This includes eating healthy foods, being active, staying at a healthy weight, and not smoking. You must take statins regularly for them to work well. If you stop, your cholesterol and your risk will go back up. Examples of statins include: · Atorvastatin (Lipitor). · Lovastatin (Mevacor). · Pravastatin (Pravachol). · Simvastatin (Zocor). Statins interact with many medicines. So tell your doctor all of the other medicines that you take. These include prescription medicines, over-the-counter medicines, dietary supplements, and herbal products. Follow-up care is a key part of your treatment and safety. Be sure to make and go to all appointments, and call your doctor if you are having problems. It's also a good idea to know your test results and keep a list of the medicines you take. How can you care for yourself at home? · Take statins exactly as your doctor tells you. High cholesterol has no symptoms. So it is easy to forget to take the pills. Try to make a system that reminds you to take them. · Do not take two or more medicines at the same time unless the doctor told you to. Statins can interact with other medicines. · Always tell your doctor if you think you are having a side effect.  If side effects are a problem with one medicine, a different one may be used. · Keep making the lifestyle changes your doctor suggests. Eat heart-healthy foods, be active, don't smoke, and stay at a healthy weight. · Talk to your doctor about avoiding grapefruit juice if you take statins. Grapefruit juice can raise the level of this medicine in your blood. This could increase side effects. When should you call for help? Watch closely for changes in your health, and be sure to contact your doctor if: 
  · You think you are having problems with your medicine.  
  · You have aches or muscle pain. Where can you learn more? Go to http://tommy-manolo.info/. Enter R358 in the search box to learn more about \"Statins: Care Instructions. \" Current as of: April 9, 2019 Content Version: 12.2 © 0614-2354 [employer]. Care instructions adapted under license by [employer] (which disclaims liability or warranty for this information). If you have questions about a medical condition or this instruction, always ask your healthcare professional. [address] any warranty or liability for your use of this information.

## 2019-12-13 NOTE — PROGRESS NOTES
Pt is here for   Chief Complaint   Patient presents with    Medication Refill     Pt denies pain at this time    1. Have you been to the ER, urgent care clinic since your last visit? Hospitalized since your last visit? No    2. Have you seen or consulted any other health care providers outside of the [address] [place] [address] since your last visit? Include any pap smears or colon screening.  No

## 2019-12-13 NOTE — PROGRESS NOTES
Mary Lee is a 62 y.o. female and presents with Medication Refill    Subjective:  Pt here to re-establish care. Last seen 9/2018. Felt fine and \"doing well\", presents today for med refill. Hypertension Review:  The patient has hypertension  Diet and Lifestyle: generally does try to follow a  low sodium diet, exercises sporadically, but active at work with [employer]. Home BP Monitoring: is not measured at home. Pertinent ROS: taking medications as instructed, only amlodipine, no medication side effects noted. No TIA's, chest pain on exertion, or dyspnea on exertion. + intermittent swelling of ankles after working. Has compression stockings but using rarely. +headaches due to heat or feeling hot. No blurring vision, nausea, or muscle spasms associated with onset. BP Readings from Last 3 Encounters:   12/13/19 128/59   09/14/18 144/73   10/20/17 135/58       Dyslipidemia Review:  Patient presents for evaluation of lipids. Compliance with treatment thus far has been n/a. Denies myalgias, slurring speech, unilateral weakness, and chest pain. A repeat fasting lipid profile was done/ordered. The patient does NOT use medications that may worsen dyslipidemias (corticosteroids, progestins, anabolic steroids, diuretics, beta-blockers, amiodarone, cyclosporine, olanzapine). The patient does try to exercise regularly. The patient is NOT known to have coexisting coronary artery disease.      Review of Systems  Constitutional: negative for fevers, chills, anorexia and weight loss  Eyes:   negative for visual disturbance, drainage, and irritation  ENT:   negative for tinnitus,sore throat,nasal congestion,ear pain,and hoarseness  Respiratory:  negative for cough, hemoptysis, dyspnea, and wheezing  CV:   negative for chest pain, palpitations, and lower extremity edema  GI:   negative for nausea, vomiting, diarrhea, abdominal pain, and melena  Endo:               negative for polyuria,polydipsia,polyphagia, and heat intolerance  Genitourinary: negative for frequency, urgency, dysuria, retention, and hematuria  Integument:  negative for rash, ulcerations, and pruritus  Hematologic:  negative for easy bruising and bleeding  Musculoskel: negative for arthralgias, muscle weakness,and joint pain/swelling  Neurological:  negative for dizziness, vertigo,and memory/gait problems  Behavl/Psych: negative for feelings of anxiety, depression, suicide, and mood changes    Past Medical History:   Diagnosis Date    HX OTHER MEDICAL     high colesterol    Hypertension      History reviewed. No pertinent surgical history. Social History     Socioeconomic History    Marital status:      Spouse name: Not on file    Number of children: Not on file    Years of education: Not on file    Highest education level: Not on file   Tobacco Use    Smoking status: Never Smoker    Smokeless tobacco: Never Used   Substance and Sexual Activity    Alcohol use: Yes     Comment: occasionaly    Drug use: No    Sexual activity: Yes     Partners: Male     Birth control/protection: None     Family History   Family history unknown: Yes     Current Outpatient Medications   Medication Sig Dispense Refill    FISH OIL-DHA-EPA PO Take  by mouth.  amLODIPine (NORVASC) 10 mg tablet Take 1 Tab by mouth daily. 90 Tab 1    aspirin 81 mg chewable tablet Take 81 mg by mouth daily.  multivitamin (ONE A DAY) tablet Take 1 Tab by mouth daily.  ascorbic acid (VITAMIN C) 500 mg tablet Take 500 mg by mouth daily.        No Known Allergies    Objective:  Visit Vitals  /59 (BP 1 Location: Right arm, BP Patient Position: Sitting)   Pulse 86   Temp 97.4 °F (36.3 °C) (Oral)   Resp 18   Ht 5' 4\" (1.626 m)   Wt 147 lb (66.7 kg)   SpO2 96%   BMI 25.23 kg/m²     Wt Readings from Last 3 Encounters:   12/13/19 147 lb (66.7 kg)   09/14/18 148 lb 14.4 oz (67.5 kg)   10/20/17 147 lb (66.7 kg)     Physical Exam:   General appearance - alert, well appearing, and in no distress. Mental status - A/O x 4, normal mood and affect. Neck -Supple ,normal CSP. FROM, non-tender. No significant adenopathy/thyromegaly. No JVD. Chest - CTA. Symmetric chest rise. No wheezing, rales or rhonchi. Heart - Normal rate, regular rhythm. Normal S1, S2. No MGR or clicks. Abdomen - Soft,non-distended. Normoactive BS in all quadrants. NT, no mass or HSM. Ext- Radial, DP pulses, 2+ bilaterally. No pedal edema, clubbing, or cyanosis. Skin-Warm and dry. No hyperpigmentation, ulcerations, or suspicious lesions. Neuro - Normal speech, no focal findings or movement disorder. Normal strength, gait, and muscle tone. Assessment/Plan:  Adherence to appt and treatment plan discussed. Primary stroke prevention reviewed. Holding on restart of statin following pt reported SE with lipitor use until ordered labs result. Use of compression stockings while at work discussed and signs of stroke. Counseled on the risks of NON-COMPLIANCE with treatment plan/health screenings/ adequate follow-up/reporting to referred specialists, to include deterioration of condition, organ failure, metastasis, and even DEATH. Medication Side Effects and Warnings were discussed with patient: yes   Patient Labs were reviewed: yes  Patient Past Records were reviewed: yes    See below for other orders   Follow-up and Dispositions    · Return in about 6 months (around 6/13/2020) for CHOL, HTN.           ICD-10-CM ICD-9-CM    1. Hypercholesteremia P26.66 808.6 METABOLIC PANEL, COMPREHENSIVE      LIPID PANEL   2. HTN (hypertension), benign I10 401.1 amLODIPine (NORVASC) 10 mg tablet      METABOLIC PANEL, COMPREHENSIVE   3. Screening for endocrine, nutritional, metabolic and immunity disorder Z81.08 D43.69 METABOLIC PANEL, COMPREHENSIVE    Z13.21  CBC WITH AUTOMATED DIFF    Z13.228  HEMOGLOBIN A1C WITH EAG    Z13.0  TSH 3RD GENERATION   4. Edema, unspecified type R60.9 782.3    5.  Nonintractable episodic headache, unspecified headache type R51 784.0      Orders Placed This Encounter    METABOLIC PANEL, COMPREHENSIVE    CBC WITH AUTOMATED DIFF    HEMOGLOBIN A1C WITH EAG    LIPID PANEL    TSH 3RD GENERATION    FISH OIL-DHA-EPA PO     Sig: Take  by mouth.  amLODIPine (NORVASC) 10 mg tablet     Sig: Take 1 Tab by mouth daily. Dispense:  90 Tab     Refill:  1       Salma Gwen expressed understanding of plan. An After Visit Summary was offered/printed and given to the patient.

## 2019-12-14 LAB
ALBUMIN SERPL-MCNC: 4.6 G/DL (ref 3.6–4.8)
ALBUMIN/GLOB SERPL: 1.4 {RATIO} (ref 1.2–2.2)
ALP SERPL-CCNC: 64 IU/L (ref 39–117)
ALT SERPL-CCNC: 27 IU/L (ref 0–32)
AST SERPL-CCNC: 27 IU/L (ref 0–40)
BASOPHILS # BLD AUTO: 0.1 X10E3/UL (ref 0–0.2)
BASOPHILS NFR BLD AUTO: 1 %
BILIRUB SERPL-MCNC: 0.6 MG/DL (ref 0–1.2)
BUN SERPL-MCNC: 11 MG/DL (ref 8–27)
BUN/CREAT SERPL: 14 (ref 12–28)
CALCIUM SERPL-MCNC: 9.2 MG/DL (ref 8.7–10.3)
CHLORIDE SERPL-SCNC: 101 MMOL/L (ref 96–106)
CHOLEST SERPL-MCNC: 279 MG/DL (ref 100–199)
CO2 SERPL-SCNC: 26 MMOL/L (ref 20–29)
CREAT SERPL-MCNC: 0.81 MG/DL (ref 0.57–1)
EOSINOPHIL # BLD AUTO: 0.2 X10E3/UL (ref 0–0.4)
EOSINOPHIL NFR BLD AUTO: 4 %
ERYTHROCYTE [DISTWIDTH] IN BLOOD BY AUTOMATED COUNT: 13 % (ref 12.3–15.4)
EST. AVERAGE GLUCOSE BLD GHB EST-MCNC: 128 MG/DL
GLOBULIN SER CALC-MCNC: 3.3 G/DL (ref 1.5–4.5)
GLUCOSE SERPL-MCNC: 110 MG/DL (ref 65–99)
HBA1C MFR BLD: 6.1 % (ref 4.8–5.6)
HCT VFR BLD AUTO: 43.5 % (ref 34–46.6)
HDLC SERPL-MCNC: 49 MG/DL
HGB BLD-MCNC: 14.9 G/DL (ref 11.1–15.9)
IMM GRANULOCYTES # BLD AUTO: 0 X10E3/UL (ref 0–0.1)
IMM GRANULOCYTES NFR BLD AUTO: 0 %
INTERPRETATION, 910389: NORMAL
LDLC SERPL CALC-MCNC: 183 MG/DL (ref 0–99)
LYMPHOCYTES # BLD AUTO: 1.8 X10E3/UL (ref 0.7–3.1)
LYMPHOCYTES NFR BLD AUTO: 31 %
MCH RBC QN AUTO: 30.1 PG (ref 26.6–33)
MCHC RBC AUTO-ENTMCNC: 34.3 G/DL (ref 31.5–35.7)
MCV RBC AUTO: 88 FL (ref 79–97)
MONOCYTES # BLD AUTO: 0.4 X10E3/UL (ref 0.1–0.9)
MONOCYTES NFR BLD AUTO: 7 %
NEUTROPHILS # BLD AUTO: 3.3 X10E3/UL (ref 1.4–7)
NEUTROPHILS NFR BLD AUTO: 57 %
PLATELET # BLD AUTO: 299 X10E3/UL (ref 150–450)
POTASSIUM SERPL-SCNC: 3.6 MMOL/L (ref 3.5–5.2)
PROT SERPL-MCNC: 7.9 G/DL (ref 6–8.5)
RBC # BLD AUTO: 4.95 X10E6/UL (ref 3.77–5.28)
SODIUM SERPL-SCNC: 142 MMOL/L (ref 134–144)
TRIGL SERPL-MCNC: 236 MG/DL (ref 0–149)
TSH SERPL DL<=0.005 MIU/L-ACNC: 1.02 UIU/ML (ref 0.45–4.5)
VLDLC SERPL CALC-MCNC: 47 MG/DL (ref 5–40)
WBC # BLD AUTO: 5.9 X10E3/UL (ref 3.4–10.8)

## 2019-12-17 DIAGNOSIS — E78.00 HYPERCHOLESTEREMIA: ICD-10-CM

## 2019-12-17 DIAGNOSIS — E78.2 MIXED HYPERLIPIDEMIA: ICD-10-CM

## 2019-12-17 RX ORDER — ATORVASTATIN CALCIUM 20 MG/1
20 TABLET, FILM COATED ORAL
Qty: 90 TAB | Refills: 1 | Status: SHIPPED | OUTPATIENT
Start: 2019-12-17 | End: 2022-04-13

## 2019-12-17 NOTE — PROGRESS NOTES
She is PRE-DIABETIC and her CHOLESTEROL remains elevated. I would like her to START Atorvastatin as last prescribed, new order sent TODAY. I would like her to return in 3 months to have this rechecked.

## 2020-08-07 ENCOUNTER — OFFICE VISIT (OUTPATIENT)
Dept: INTERNAL MEDICINE CLINIC | Age: 60
End: 2020-08-07
Payer: COMMERCIAL

## 2020-08-07 VITALS
TEMPERATURE: 97.3 F | DIASTOLIC BLOOD PRESSURE: 66 MMHG | RESPIRATION RATE: 17 BRPM | WEIGHT: 143 LBS | HEIGHT: 64 IN | BODY MASS INDEX: 24.41 KG/M2 | SYSTOLIC BLOOD PRESSURE: 138 MMHG | HEART RATE: 82 BPM | OXYGEN SATURATION: 98 %

## 2020-08-07 DIAGNOSIS — E78.00 HYPERCHOLESTEREMIA: ICD-10-CM

## 2020-08-07 DIAGNOSIS — R73.03 PREDIABETES: ICD-10-CM

## 2020-08-07 DIAGNOSIS — R51.9 INTERMITTENT HEADACHE: ICD-10-CM

## 2020-08-07 DIAGNOSIS — G47.9 SLEEP DISTURBANCE: ICD-10-CM

## 2020-08-07 DIAGNOSIS — M47.22 OSTEOARTHRITIS OF SPINE WITH RADICULOPATHY, CERVICAL REGION: Primary | ICD-10-CM

## 2020-08-07 DIAGNOSIS — I10 HTN (HYPERTENSION), BENIGN: ICD-10-CM

## 2020-08-07 LAB
CHOLEST SERPL-MCNC: 255 MG/DL
GLUCOSE POC: 89 MG/DL
HBA1C MFR BLD HPLC: 5.7 %
HDLC SERPL-MCNC: 40 MG/DL
LDL CHOLESTEROL POC: NORMAL MG/DL
NON-HDL GOAL (POC): 215
TCHOL/HDL RATIO (POC): 6.4
TRIGL SERPL-MCNC: 401 MG/DL

## 2020-08-07 PROCEDURE — 80061 LIPID PANEL: CPT | Performed by: NURSE PRACTITIONER

## 2020-08-07 PROCEDURE — 83036 HEMOGLOBIN GLYCOSYLATED A1C: CPT | Performed by: NURSE PRACTITIONER

## 2020-08-07 PROCEDURE — 99214 OFFICE O/P EST MOD 30 MIN: CPT | Performed by: NURSE PRACTITIONER

## 2020-08-07 PROCEDURE — 82962 GLUCOSE BLOOD TEST: CPT | Performed by: NURSE PRACTITIONER

## 2020-08-07 RX ORDER — NAPROXEN 500 MG/1
500 TABLET ORAL
Qty: 20 TAB | Refills: 0 | Status: SHIPPED | OUTPATIENT
Start: 2020-08-07 | End: 2020-08-17

## 2020-08-07 NOTE — PROGRESS NOTES
Agustina Qureshi is a 56 y.o. female and presents with Follow-up (CHOL, and HTN) and Headache    Subjective:  Hypertension Review:  The patient has hypertension  Diet and Lifestyle: generally does try to follow a  low sodium diet, exercises sporadically, but active at work with [employer]. Home BP Monitoring: is not measured at home. Pertinent ROS: taking medications as instructed, only amlodipine, no medication side effects noted. No TIA's, chest pain on exertion, or dyspnea on exertion. +headaches due to heat over past month, but resolved now. No blurring vision, nausea, or muscle spasms associated with onset. BP Readings from Last 3 Encounters:   08/07/20 138/66   12/13/19 128/59   09/14/18 144/73     Prediabetes Review:  Agustina Qureshi has pre-diabetes  Diabetic ROS -negative for polyuria,polydipsia,polyphagia, and heat intolerance  Medication compliance: n/a   Home glucose monitoring: n/a  Diabetic diet compliance: compliant some of the time,   Exercises sporadically  Weight: stable  Wt Readings from Last 3 Encounters:   08/07/20 143 lb (64.9 kg)   12/13/19 147 lb (66.7 kg)   09/14/18 148 lb 14.4 oz (67.5 kg)       Lab Results   Component Value Date/Time    Hemoglobin A1c 6.1 (H) 12/13/2019 09:45 AM     Dyslipidemia Review:  Patient presents for evaluation of lipids. Compliance with treatment thus far has been n/a, diet controlled and takes fish oil supplements. Denies myalgias, slurring speech, unilateral weakness, and chest pain. A repeat fasting lipid profile was done/ordered. The patient does NOT use medications that may worsen dyslipidemias (corticosteroids, progestins, anabolic steroids, diuretics, beta-blockers, amiodarone, cyclosporine, olanzapine). The patient does NOT try to exercise regularly, but active at work. The patient is NOT known to have coexisting coronary artery disease. Also report VIV shoulder pain, worse at work with pushing carts. Associated with hand numbness and pain.  H/o cervical spondylosis. Pain Scale: 0 - No pain/10. No oral meds taken but activity and topical agents help. Review of Systems  Constitutional: negative for fevers, chills, anorexia and weight loss  Eyes:   +watery eyes and pain, last eye exam in March last year. negative for drainage and irritation  ENT:   negative for tinnitus,sore throat,nasal congestion,ear pain,and hoarseness  Respiratory:  negative for cough, hemoptysis, dyspnea, and wheezing  CV:   negative for chest pain, palpitations, and lower extremity edema  GI:   negative for nausea, vomiting, diarrhea, abdominal pain, and melena  Endo:               negative for polyuria,polydipsia,polyphagia, and heat intolerance  Genitourinary: negative for frequency, urgency, dysuria, retention, and hematuria  Integument:  negative for rash, ulcerations, and pruritus  Hematologic:  negative for easy bruising and bleeding  Musculoskel: negative for muscle weakness,and joint pain/swelling  Neurological:  negative for dizziness, vertigo,and memory/gait problems  Behavl/Psych: +sleeping issue, not sleeping well. Tried teas and oils without relief. negative for feelings of anxiety, depression, suicide, and mood changes    Past Medical History:   Diagnosis Date    HX OTHER MEDICAL     high colesterol    Hypertension      History reviewed. No pertinent surgical history.   Social History     Socioeconomic History    Marital status:      Spouse name: Not on file    Number of children: Not on file    Years of education: Not on file    Highest education level: Not on file   Tobacco Use    Smoking status: Never Smoker    Smokeless tobacco: Never Used   Substance and Sexual Activity    Alcohol use: Yes     Comment: occasionaly    Drug use: No    Sexual activity: Yes     Partners: Male     Birth control/protection: None     Family History   Family history unknown: Yes     Current Outpatient Medications   Medication Sig Dispense Refill    naproxen (NAPROSYN) 500 mg tablet Take 1 Tab by mouth two (2) times daily as needed for Pain for up to 10 days. With food 20 Tab 0    atorvastatin (LIPITOR) 20 mg tablet Take 1 Tab by mouth nightly. Indications: excessive fat in the blood 90 Tab 1    FISH OIL-DHA-EPA PO Take  by mouth.  amLODIPine (NORVASC) 10 mg tablet Take 1 Tab by mouth daily. 90 Tab 1    aspirin 81 mg chewable tablet Take 81 mg by mouth daily.  multivitamin (ONE A DAY) tablet Take 1 Tab by mouth daily.  ascorbic acid (VITAMIN C) 500 mg tablet Take 500 mg by mouth daily. No Known Allergies    Objective:  Visit Vitals  /66 (BP 1 Location: Left arm, BP Patient Position: Sitting)   Pulse 82   Temp 97.3 °F (36.3 °C) (Oral)   Resp 17   Ht 5' 4\" (1.626 m)   Wt 143 lb (64.9 kg)   SpO2 98%   BMI 24.55 kg/m²     Wt Readings from Last 3 Encounters:   08/07/20 143 lb (64.9 kg)   12/13/19 147 lb (66.7 kg)   09/14/18 148 lb 14.4 oz (67.5 kg)     Physical Exam:   General appearance - alert, well appearing, and in no distress. Mental status - A/O x 4, normal mood and affect. Neck -Supple ,normal CSP. FROM, non-tender. No significant adenopathy/thyromegaly. No JVD. Equal and strong . Chest - CTA. Symmetric chest rise. No wheezing, rales or rhonchi. Heart - Normal rate, regular rhythm. Normal S1, S2. No MGR or clicks. Abdomen - Soft,non-distended. Normoactive BS in all quadrants. NT, no mass or HSM. Ext- Radial, DP pulses, 2+ bilaterally. No pedal edema, clubbing, or cyanosis. Skin-Warm and dry. No hyperpigmentation, ulcerations, or suspicious lesions. Neuro - Normal speech, no focal findings or movement disorder. Normal strength, gait, and muscle tone. Assessment/Plan:  Offered PT and repeat imaging today, pt deferred. Advised to seek care with optometry and adjust vents for eye tearing and pain reported. CHOL elevated, use of fish oil, niacin, good food choices, and exercise. No LDL reported today , but TRIG high and HDL low.  Pt admitted to eating pulled pork at party recently. Medication Side Effects and Warnings were discussed with patient: yes   Patient Labs were reviewed: yes  Patient Past Records were reviewed: yes    See below for other orders   Follow-up and Dispositions    · Return in about 6 months (around 2/7/2021) for OV- cerv radiculopathy, sleep, HA, HTN, CHOL, preDM. ICD-10-CM ICD-9-CM    1. Osteoarthritis of spine with radiculopathy, cervical region  M47.22 721.0 naproxen (NAPROSYN) 500 mg tablet   2. HTN (hypertension), benign  I10 401.1    3. Hypercholesteremia  E78.00 272.0 AMB POC LIPID PROFILE   4. Prediabetes  R73.03 790.29 AMB POC HEMOGLOBIN A1C      AMB POC GLUCOSE BLOOD, BY GLUCOSE MONITORING DEVICE   5. Sleep disturbance  G47.9 780.50    6. Intermittent headache  R51 784.0      Orders Placed This Encounter    AMB POC LIPID PROFILE    AMB POC HEMOGLOBIN A1C    AMB POC GLUCOSE BLOOD, BY GLUCOSE MONITORING DEVICE    naproxen (NAPROSYN) 500 mg tablet     Sig: Take 1 Tab by mouth two (2) times daily as needed for Pain for up to 10 days. With food     Dispense:  20 Tab     Refill:  0       Salma Gwen expressed understanding of plan. An After Visit Summary was offered/printed and given to the patient.

## 2020-08-07 NOTE — PATIENT INSTRUCTIONS
Cervical Spondylosis: Care Instructions Your Care Instructions Cervical spondylosis is a type of arthritis of the neck. It can happen as people get older. It may be caused by bone spurs or other problems. You may have neck pain and stiffness. Sometimes the space around the spinal cord narrows. When this happens, it is called spinal stenosis. Spinal stenosis can cause pain, numbness, or weakness in the arms, legs, feet, and rear end (buttocks). It can also cause loss of bowel and bladder control. You can treat some of your symptoms with over-the-counter pain medicine. But if you have spinal stenosis with severe symptoms, you may need surgery. Follow-up care is a key part of your treatment and safety. Be sure to make and go to all appointments, and call your doctor if you are having problems. It's also a good idea to know your test results and keep a list of the medicines you take. How can you care for yourself at home? · Take anti-inflammatory medicines to reduce neck pain. These include ibuprofen (Advil, Motrin) and naproxen (Aleve). Be safe with medicines. Read and follow all instructions on the label. · Follow your doctor's recommendation about activity. He or she may tell you not to do sports or activities that could injure your neck. When should you call for help? [id number] anytime you think you may need emergency care. For example, call if: 
· You are unable to move an arm or a leg at all. Call your doctor now or seek immediate medical care if: 
· You have new or worse symptoms in your arms, legs, belly, or buttocks. Symptoms may include: 
? Numbness or tingling. ? Weakness. ? Pain. · You lose bladder or bowel control. Watch closely for changes in your health, and be sure to contact your doctor if: 
· You do not get better as expected. Where can you learn more? Go to http://tommy-manolo.info/ Enter N293 in the search box to learn more about \"Cervical Spondylosis: Care Instructions. \" Current as of: March 2, 2020               Content Version: 12.5 © 5954-6578 [employer]. Care instructions adapted under license by [employer] (which disclaims liability or warranty for this information). If you have questions about a medical condition or this instruction, always ask your healthcare professional. [address] any warranty or liability for your use of this information. Neck: Exercises Introduction Here are some examples of exercises for you to try. The exercises may be suggested for a condition or for rehabilitation. Start each exercise slowly. Ease off the exercises if you start to have pain. You will be told when to start these exercises and which ones will work best for you. How to do the exercises Neck stretch 1. This stretch works best if you keep your shoulder down as you lean away from it. To help you remember to do this, start by relaxing your shoulders and lightly holding on to your thighs or your chair. 2. Tilt your head toward your shoulder and hold for 15 to 30 seconds. Let the weight of your head stretch your muscles. 3. If you would like a little added stretch, use your hand to gently and steadily pull your head toward your shoulder. For example, keeping your right shoulder down, lean your head to the left. 4. Repeat 2 to 4 times toward each shoulder. Diagonal neck stretch 1. Turn your head slightly toward the direction you will be stretching, and tilt your head diagonally toward your chest and hold for 15 to 30 seconds. 2. If you would like a little added stretch, use your hand to gently and steadily pull your head forward on the diagonal. 
3. Repeat 2 to 4 times toward each side. Dorsal glide stretch The dorsal glide stretches the back of the neck. If you feel pain, do not glide so far back.  Some people find this exercise easier to do while lying on their backs with an ice pack on the neck. 1. Sit or stand tall and look straight ahead. 2. Slowly tuck your chin as you glide your head backward over your body 3. Hold for a count of 6, and then relax for up to 10 seconds. 4. Repeat 8 to 12 times. Chest and shoulder stretch 1. Sit or stand tall and glide your head backward as in the dorsal glide stretch. 2. Raise both arms so that your hands are next to your ears. 3. Take a deep breath, and as you breathe out, lower your elbows down and behind your back. You will feel your shoulder blades slide down and together, and at the same time you will feel a stretch across your chest and the front of your shoulders. 4. Hold for about 6 seconds, and then relax for up to 10 seconds. 5. Repeat 8 to 12 times. Strengthening: Hands on head 1. Move your head backward, forward, and side to side against gentle pressure from your hands, holding each position for about 6 seconds. 2. Repeat 8 to 12 times. Follow-up care is a key part of your treatment and safety. Be sure to make and go to all appointments, and call your doctor if you are having problems. It's also a good idea to know your test results and keep a list of the medicines you take. Where can you learn more? Go to http://tommy-manolo.info/ Enter P975 in the search box to learn more about \"Neck: Exercises. \" Current as of: March 2, 2020               Content Version: 12.5 © 2006-2020 [employer]. Care instructions adapted under license by [employer] (which disclaims liability or warranty for this information). If you have questions about a medical condition or this instruction, always ask your healthcare professional. [address] any warranty or liability for your use of this information.

## 2021-08-24 ENCOUNTER — OFFICE VISIT (OUTPATIENT)
Dept: INTERNAL MEDICINE CLINIC | Age: 61
End: 2021-08-24
Payer: COMMERCIAL

## 2021-08-24 VITALS
DIASTOLIC BLOOD PRESSURE: 61 MMHG | TEMPERATURE: 97.9 F | WEIGHT: 140 LBS | BODY MASS INDEX: 23.9 KG/M2 | SYSTOLIC BLOOD PRESSURE: 137 MMHG | OXYGEN SATURATION: 96 % | RESPIRATION RATE: 18 BRPM | HEART RATE: 99 BPM | HEIGHT: 64 IN

## 2021-08-24 DIAGNOSIS — M47.812 SPONDYLOSIS OF CERVICAL REGION WITHOUT MYELOPATHY OR RADICULOPATHY: ICD-10-CM
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
## 2021-08-24 DIAGNOSIS — M25.512 ACUTE PAIN OF LEFT SHOULDER: ICD-10-CM

## 2021-08-24 DIAGNOSIS — R51.9 NONINTRACTABLE EPISODIC HEADACHE, UNSPECIFIED HEADACHE TYPE: ICD-10-CM

## 2021-08-24 DIAGNOSIS — M54.6 ACUTE LEFT-SIDED THORACIC BACK PAIN: Primary | ICD-10-CM

## 2021-08-24 DIAGNOSIS — M62.838 NECK MUSCLE SPASM: ICD-10-CM

## 2021-08-24 DIAGNOSIS — I10 HTN (HYPERTENSION), BENIGN: ICD-10-CM

## 2021-08-24 PROCEDURE — 99214 OFFICE O/P EST MOD 30 MIN: CPT | Performed by: NURSE PRACTITIONER

## 2021-08-24 RX ORDER — AMLODIPINE BESYLATE 10 MG/1
10 TABLET ORAL DAILY
Qty: 90 TABLET | Refills: 3 | Status: SHIPPED | OUTPATIENT
Start: 2021-08-24

## 2021-08-24 RX ORDER — METHOCARBAMOL 500 MG/1
500 TABLET, FILM COATED ORAL
Qty: 20 TABLET | Refills: 0 | Status: SHIPPED | OUTPATIENT
Start: 2021-08-24

## 2021-08-24 RX ORDER — NAPROXEN 500 MG/1
500 TABLET ORAL
Qty: 20 TABLET | Refills: 0 | Status: SHIPPED | OUTPATIENT
Start: 2021-08-24 | End: 2021-09-03
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
## 2022-03-19 PROBLEM — E78.2 MIXED HYPERLIPIDEMIA: Status: ACTIVE | Noted: 2017-10-31

## 2022-03-20 PROBLEM — M47.812 SPONDYLOSIS OF CERVICAL REGION WITHOUT MYELOPATHY OR RADICULOPATHY: Status: ACTIVE | Noted: 2018-09-14

## 2022-04-13 DIAGNOSIS — E78.2 MIXED HYPERLIPIDEMIA: ICD-10-CM

## 2022-04-13 DIAGNOSIS — E78.00 HYPERCHOLESTEREMIA: ICD-10-CM

## 2022-04-13 RX ORDER — ATORVASTATIN CALCIUM 20 MG/1
TABLET, FILM COATED ORAL
Qty: 90 TABLET | Refills: 0 | Status: SHIPPED | OUTPATIENT
Start: 2022-04-13

## 2022-06-17 ENCOUNTER — OFFICE VISIT (OUTPATIENT)
Dept: INTERNAL MEDICINE CLINIC | Age: 62
End: 2022-06-17

## 2022-06-17 VITALS
BODY MASS INDEX: 23.66 KG/M2 | HEIGHT: 64 IN | TEMPERATURE: 97.9 F | WEIGHT: 138.6 LBS | RESPIRATION RATE: 18 BRPM | SYSTOLIC BLOOD PRESSURE: 131 MMHG | DIASTOLIC BLOOD PRESSURE: 59 MMHG | OXYGEN SATURATION: 100 % | HEART RATE: 75 BPM

## 2022-06-17 DIAGNOSIS — Z12.31 ENCOUNTER FOR SCREENING MAMMOGRAM FOR MALIGNANT NEOPLASM OF BREAST: ICD-10-CM

## 2022-06-17 DIAGNOSIS — M47.812 SPONDYLOSIS OF CERVICAL REGION WITHOUT MYELOPATHY OR RADICULOPATHY: ICD-10-CM

## 2022-06-17 DIAGNOSIS — Z12.11 SPECIAL SCREENING FOR MALIGNANT NEOPLASMS, COLON: ICD-10-CM

## 2022-06-17 DIAGNOSIS — E78.2 MIXED HYPERLIPIDEMIA: Primary | ICD-10-CM

## 2022-06-17 DIAGNOSIS — Z13.0 SCREENING FOR ENDOCRINE, NUTRITIONAL, METABOLIC AND IMMUNITY DISORDER: ICD-10-CM

## 2022-06-17 DIAGNOSIS — I10 HTN (HYPERTENSION), BENIGN: ICD-10-CM

## 2022-06-17 DIAGNOSIS — Z13.29 SCREENING FOR ENDOCRINE, NUTRITIONAL, METABOLIC AND IMMUNITY DISORDER: ICD-10-CM

## 2022-06-17 DIAGNOSIS — Z13.228 SCREENING FOR ENDOCRINE, NUTRITIONAL, METABOLIC AND IMMUNITY DISORDER: ICD-10-CM

## 2022-06-17 DIAGNOSIS — Z13.21 SCREENING FOR ENDOCRINE, NUTRITIONAL, METABOLIC AND IMMUNITY DISORDER: ICD-10-CM

## 2022-06-17 PROCEDURE — 99214 OFFICE O/P EST MOD 30 MIN: CPT | Performed by: NURSE PRACTITIONER

## 2022-06-17 RX ORDER — ATORVASTATIN CALCIUM 20 MG/1
20 TABLET, FILM COATED ORAL
Qty: 90 TABLET | Refills: 0 | Status: CANCELLED | OUTPATIENT
Start: 2022-06-17

## 2022-06-17 NOTE — PATIENT INSTRUCTIONS
Learning About How to Prevent a Stroke  What is a stroke? A stroke is damage to the brain that occurs when a blood vessel in the brain bursts or is blocked by a blood clot. Without blood and the oxygen it carries, part of the brain starts to die. The part of the body controlled by the damaged area of the brain can't work properly. Brain damage can start within minutes of a stroke. But quick treatment can help limit the damage and increase the chance of a full recovery. What puts you at risk for stroke? A risk factor is anything that makes you more likely to have a particular health problem. Risk factors for stroke that you can manage or change include:  · Health problems like atrial fibrillation, diabetes, high blood pressure, high cholesterol, hardening of the arteries (atherosclerosis), and sickle cell disease. · Smoking. · Drinking more than 2 alcoholic drinks a day for men and 1 drink a day for women. · Being overweight. · Not eating healthy foods. · Not getting enough physical activity. Risk factors you can't change include:  · Having a previous stroke. · Family history of stroke. · Being older. · Being [place] American, [place] Native, Native [place], or [facility]. · Being female. · Having certain problems during pregnancy, such as preeclampsia. · Being past menopause. Your doctor can help you know your risk. Then you and your doctor can talk about whether to take steps to lower it. How can you help prevent a stroke? · Manage health problems that raise your risk. These include atrial fibrillation, diabetes, high blood pressure, and high cholesterol. · Have a heart-healthy lifestyle. ? Don't smoke. If you need help quitting, talk to your doctor about stop-smoking programs and medicines. These can increase your chances of quitting for good. ? Limit alcohol to 2 drinks a day for men and 1 drink a day for women. ? Stay at a healthy weight. Lose weight if you need to.  ?  Be active. Get at least 30 minutes of exercise on most days of the week. Walking is a good choice. You also may want to do other activities, such as running, swimming, cycling, or playing tennis or team sports. ? Eat heart-healthy foods. These include vegetables, fruits, nuts, beans, lean meat, fish, and whole grains. Limit sodium and sugar. ? If you think you may have a problem with alcohol or drug use, talk to your doctor. · If you use hormone therapy or hormonal birth control, talk with your doctor. Ask if these are right for you. They may raise the risk of stroke in some people. · Decide with your doctor whether you will also take medicines to help lower your risk. For example, you and your doctor may decide you will take a medicine that prevents blood clots. What are the BE FAST stroke warning signs? BE FAST is a simple way to remember the main symptoms of stroke. These symptoms happen suddenly. So knowing what to look for helps you know when to call for medical help. BE FAST stands for:  · B alance. Loss of balance or trouble walking. · E yes. Trouble seeing out of one or both eyes. · F ace. Weakness or drooping on one side of the face. · A rm. Weakness or numbness in an arm or leg. · S peech. Trouble speaking. · T marilynn to call 911. Other stroke symptoms include sudden confusion, sudden trouble understanding simple statements, fainting, a seizure, and a sudden, severe headache. What are the symptoms of a stroke? Symptoms of a stroke happen quickly. A stroke may cause:  · Sudden numbness, tingling, weakness, or loss of movement in your face, arm, or leg, especially on only one side of your body. · Sudden vision changes. · Sudden trouble speaking. · Sudden confusion or trouble understanding simple statements. · Sudden problems with walking or balance. · A sudden, severe headache that is different from past headaches. · Fainting. · A seizure.   It's important to call for medical help if you have stroke symptoms. Quick treatment may save your life. And it may reduce the damage in your brain so that you have fewer problems after the stroke. Follow-up care is a key part of your treatment and safety. Be sure to make and go to all appointments, and call your doctor if you are having problems. It's also a good idea to know your test results and keep a list of the medicines you take. Where can you learn more? Go to http://www.gray.com/  Enter G757 in the search box to learn more about \"Learning About How to Prevent a Stroke. \"  Current as of: July 6, 2021               Content Version: 13.2  © 1554-6185 [employer]. Care instructions adapted under license by [employer] (which disclaims liability or warranty for this information). If you have questions about a medical condition or this instruction, always ask your healthcare professional. [place] [address] any warranty or liability for your use of this information. Mammogram: About This Test  What is it? A mammogram is an X-ray of the breast that is used to screen for breast cancer. This test can find tumors that are too small for you or your doctor to feel. Cancer is most easily treated when it is found at an early stage. Why is this test done? A mammogram is done to:  · Look for breast cancer when there are no symptoms. · Find breast cancer when there are symptoms. Symptoms of breast cancer may include a lump or thickening in the breast, nipple discharge, or dimpling of the skin on one area of the breast.  · Find an area of suspicious breast tissue to remove for an exam under a microscope (biopsy). How do you prepare for the test?  If you've had a mammogram before at another clinic, have the results sent or bring them with you to your appointment. On the day of the mammogram, don't use any deodorant. And don't use perfume, powders, or ointments near or on your breasts.  The residue left on your skin by these substances may interfere with the X-rays. How is the test done? · You will need to take off any jewelry that might interfere with the X-ray pictures. · You will need to take off your clothes above the waist.  · You will be given a cloth or paper gown to use during the test.  · You probably will stand during the mammogram.  · One at a time, your breasts will be placed on a flat plate. · Another plate is then pressed firmly against your breast to help flatten out the breast tissue. You may be asked to lift your arm. · For a few seconds while the X-ray picture is being taken, you will need to hold your breath. · At least two pictures are taken of each breast. One is taken from the top and one from the side. How does having a mammogram feel? A mammogram is often uncomfortable but rarely painful. If you have sensitive or fragile skin or a skin condition, let the technician know before you have your exam. If you have menstrual periods, the procedure is more comfortable when done within 2 weeks after your period has ended. Having your breasts flattened is usually uncomfortable, but it helps the technician get the best images. How long does the test take? · The test will take about 10 to 15 minutes. You may be in the clinic for up to an hour. · You may be asked to wait a few minutes while the images are checked to make sure they don't need to be redone. What happens after the test?  · You will probably be able to go home right away. · You can go back to your usual activities right away. Follow-up care is a key part of your treatment and safety. Be sure to make and go to all appointments, and call your doctor if you are having problems. It's also a good idea to keep a list of the medicines you take. Ask your doctor when you can expect to have your test results. Where can you learn more?   Go to http://www.gray.com/  Enter Z238 in the search box to learn more about \"Mammogram: About This Test.\"  Current as of: September 8, 2021               Content Version: 13.2  © 9318-1837 [employer]. Care instructions adapted under license by [employer] (which disclaims liability or warranty for this information). If you have questions about a medical condition or this instruction, always ask your healthcare professional. [address] any warranty or liability for your use of this information.

## 2022-06-17 NOTE — PROGRESS NOTES
Diamond Warner (: 1960) is a 57 y.o. female, established patient, here for evaluation of the following chief complaint(s):  Medication Refill (atorvastatin ) and Labs       ASSESSMENT/PLAN:  Below is the assessment and plan developed based on review of pertinent history, physical exam, labs, studies, and medications. 1. Mixed hyperlipidemia  -     LIPID PANEL; Future  2. Spondylosis of cervical region without myelopathy or radiculopathy  3. HTN (hypertension), benign  -     CBC WITH AUTOMATED DIFF; Future  -     METABOLIC PANEL, COMPREHENSIVE; Future  4. Screening for endocrine, nutritional, metabolic and immunity disorder  -     TSH 3RD GENERATION; Future  -     HEMOGLOBIN A1C WITH EAG; Future  -     CBC WITH AUTOMATED DIFF; Future  -     METABOLIC PANEL, COMPREHENSIVE; Future  5. Encounter for screening mammogram for malignant neoplasm of breast  -     SON MAMMO BI SCREENING INCL CAD; Future  6. Special screening for malignant neoplasms, colon  -     COLOGUARD TEST (FECAL DNA COLORECTAL CANCER SCREENING)      Return in about 6 months (around 2022) for OV- HTN, CHOL. Pt asked to complete follow by next visit: continue present plan, routine labs ordered, have labs drawn prior to ROV    SUBJECTIVE/OBJECTIVE:  HPI    Pt presents to f/u HTN & CHOL. Home BP readings running 110/60-80's. Taking meds as prescribed, but just restarted statin ~ 2 months ago. Denies side effects from medication, but unsure if right flank pain r/t statin use or not. Has intermittently, not excruciating. Feels right neck pain continues, but only intermittently, worse at night. No report of unilateral weakness, headaches, blurring vision, CP, SOB,or  leg swelling.    BP Readings from Last 3 Encounters:   22 (!) 131/59   21 137/61   20 138/66       Lab Results   Component Value Date/Time    Cholesterol, total 279 (H) 2019 09:45 AM    Cholesterol (POC) 255 2020 11:57 AM    HDL Cholesterol 49 12/13/2019 09:45 AM    HDL Cholesterol (POC) 40 08/07/2020 11:57 AM    LDL Cholesterol (POC) N/A 08/07/2020 11:57 AM    LDL, calculated 183 (H) 12/13/2019 09:45 AM    VLDL, calculated 47 (H) 12/13/2019 09:45 AM    Triglyceride 236 (H) 12/13/2019 09:45 AM    Triglycerides (POC) 401 08/07/2020 11:57 AM           Review of Systems  Constitutional: negative for fevers, chills, anorexia and weight loss  Respiratory:  negative for cough, hemoptysis, dyspnea, and wheezing  CV:   negative for chest pain, palpitations, and lower extremity edema  GI:   negative for nausea, vomiting, diarrhea, abdominal pain, and melena  Endo:               negative for polyuria,polydipsia,polyphagia, and heat intolerance  Genitourinary: negative for frequency, urgency, dysuria, retention, and hematuria  Integument:  negative for rash, ulcerations, and pruritus  Hematologic:  negative for easy bruising and bleeding  Musculoskel: negative for arthralgias, muscle weakness,and joint pain/swelling  Neurological:  negative for headaches, dizziness, vertigo,and memory/gait problems  Behavl/Psych: negative for feelings of anxiety, depression, suicide, and mood changes    Visit Vitals  BP (!) 131/59 (BP 1 Location: Left upper arm, BP Patient Position: Sitting, BP Cuff Size: Adult)   Pulse 75   Temp 97.9 °F (36.6 °C) (Temporal)   Resp 18   Ht 5' 4\" (1.626 m)   Wt 138 lb 9.6 oz (62.9 kg)   SpO2 100%   BMI 23.79 kg/m²       Wt Readings from Last 3 Encounters:   06/17/22 138 lb 9.6 oz (62.9 kg)   08/24/21 140 lb (63.5 kg)   08/07/20 143 lb (64.9 kg)         Physical Exam:   General appearance - alert, well appearing, and in no distress. Mental status - A/O x 4,normal mood and affect. Neck- FROM. NT cervical processes. Great arm/shoulder strength. Chest - CTA. Symmetric chest rise. No wheezing. No distress. Heart - Normal rate & rhythm. Normal S1 & S2. No MGR. Abdomen- Soft, round. Non-distended, NT. No pulsatile masses or hernias.   Ext-  No pedal edema, clubbing, or cyanosis. Skin-Warm and dry. No hyperpigmentation, ulcerations, or suspicious lesions. Neuro - Normal speech, no focal findings or movement disorder. Normal strength, gait, and muscle tone. An electronic signature was used to authenticate this note.   -- Seth Tucker, NP

## 2023-01-17 DIAGNOSIS — I10 HTN (HYPERTENSION), BENIGN: ICD-10-CM

## 2023-01-17 RX ORDER — AMLODIPINE BESYLATE 10 MG/1
TABLET ORAL
Qty: 90 TABLET | Refills: 0 | Status: SHIPPED | OUTPATIENT
Start: 2023-01-17

## 2023-06-07 ENCOUNTER — APPOINTMENT (OUTPATIENT)
Facility: HOSPITAL | Age: 63
End: 2023-06-07
Payer: COMMERCIAL

## 2023-06-07 ENCOUNTER — HOSPITAL ENCOUNTER (OUTPATIENT)
Facility: HOSPITAL | Age: 63
Setting detail: OBSERVATION
Discharge: HOME | End: 2023-06-09
Attending: STUDENT IN AN ORGANIZED HEALTH CARE EDUCATION/TRAINING PROGRAM | Admitting: STUDENT IN AN ORGANIZED HEALTH CARE EDUCATION/TRAINING PROGRAM
Payer: COMMERCIAL

## 2023-06-07 ENCOUNTER — APPOINTMENT (OUTPATIENT)
Facility: HOSPITAL | Age: 63
End: 2023-06-07
Attending: STUDENT IN AN ORGANIZED HEALTH CARE EDUCATION/TRAINING PROGRAM
Payer: COMMERCIAL

## 2023-06-07 DIAGNOSIS — R42 LIGHTHEADEDNESS: ICD-10-CM

## 2023-06-07 DIAGNOSIS — R77.8 ELEVATED TROPONIN: ICD-10-CM

## 2023-06-07 DIAGNOSIS — D64.9 ANEMIA, UNSPECIFIED TYPE: ICD-10-CM

## 2023-06-07 DIAGNOSIS — R55 SYNCOPE AND COLLAPSE: Primary | ICD-10-CM

## 2023-06-07 PROBLEM — R79.89 ELEVATED TROPONIN: Status: ACTIVE | Noted: 2023-06-07

## 2023-06-07 LAB
ALBUMIN SERPL-MCNC: 4 G/DL (ref 3.5–5)
ALBUMIN/GLOB SERPL: 1.1 (ref 1.1–2.2)
ALP SERPL-CCNC: 53 U/L (ref 45–117)
ALT SERPL-CCNC: 40 U/L (ref 12–78)
ANION GAP SERPL CALC-SCNC: 6 MMOL/L (ref 5–15)
AST SERPL-CCNC: 20 U/L (ref 15–37)
BASOPHILS # BLD: 0 K/UL (ref 0–0.1)
BASOPHILS NFR BLD: 1 % (ref 0–1)
BILIRUB SERPL-MCNC: 0.4 MG/DL (ref 0.2–1)
BUN SERPL-MCNC: 10 MG/DL (ref 6–20)
BUN/CREAT SERPL: 12 (ref 12–20)
CALCIUM SERPL-MCNC: 8.8 MG/DL (ref 8.5–10.1)
CHLORIDE SERPL-SCNC: 104 MMOL/L (ref 97–108)
CO2 SERPL-SCNC: 30 MMOL/L (ref 21–32)
CREAT SERPL-MCNC: 0.83 MG/DL (ref 0.55–1.02)
DIFFERENTIAL METHOD BLD: ABNORMAL
ECHO AO ROOT DIAM: 3 CM
ECHO AO ROOT INDEX: 1.8 CM/M2
ECHO AV AREA PEAK VELOCITY: 2.3 CM2
ECHO AV AREA/BSA PEAK VELOCITY: 1.4 CM2/M2
ECHO AV PEAK GRADIENT: 13 MMHG
ECHO AV PEAK VELOCITY: 1.8 M/S
ECHO AV VELOCITY RATIO: 0.78
ECHO BSA: 1.68 M2
ECHO LA DIAMETER INDEX: 2.16 CM/M2
ECHO LA DIAMETER: 3.6 CM
ECHO LA TO AORTIC ROOT RATIO: 1.2
ECHO LA VOL 2C: 32 ML (ref 22–52)
ECHO LA VOL 2C: 33 ML (ref 22–52)
ECHO LA VOL 4C: 53 ML (ref 22–52)
ECHO LA VOL 4C: 55 ML (ref 22–52)
ECHO LA VOLUME AREA LENGTH: 47 ML
ECHO LA VOLUME INDEX AREA LENGTH: 28 ML/M2 (ref 16–34)
ECHO LV E' LATERAL VELOCITY: 5 CM/S
ECHO LV E' SEPTAL VELOCITY: 5 CM/S
ECHO LV EDV A4C: 125 ML
ECHO LV EDV INDEX A4C: 75 ML/M2
ECHO LV EJECTION FRACTION A4C: 70 %
ECHO LV ESV A4C: 38 ML
ECHO LV ESV INDEX A4C: 23 ML/M2
ECHO LV FRACTIONAL SHORTENING: 34 % (ref 28–44)
ECHO LV INTERNAL DIMENSION DIASTOLE INDEX: 2.63 CM/M2
ECHO LV INTERNAL DIMENSION DIASTOLIC: 4.4 CM (ref 3.9–5.3)
ECHO LV INTERNAL DIMENSION SYSTOLIC INDEX: 1.74 CM/M2
ECHO LV INTERNAL DIMENSION SYSTOLIC: 2.9 CM
ECHO LV IVSD: 1.4 CM (ref 0.6–0.9)
ECHO LV MASS 2D: 227.5 G (ref 67–162)
ECHO LV MASS INDEX 2D: 136.2 G/M2 (ref 43–95)
ECHO LV POSTERIOR WALL DIASTOLIC: 1.3 CM (ref 0.6–0.9)
ECHO LV RELATIVE WALL THICKNESS RATIO: 0.59
ECHO LVOT AREA: 3.1 CM2
ECHO LVOT DIAM: 2 CM
ECHO LVOT PEAK GRADIENT: 7 MMHG
ECHO LVOT PEAK VELOCITY: 1.4 M/S
ECHO MV A VELOCITY: 0.92 M/S
ECHO MV E DECELERATION TIME (DT): 238.8 MS
ECHO MV E VELOCITY: 0.83 M/S
ECHO MV E/A RATIO: 0.9
ECHO MV E/E' LATERAL: 16.6
ECHO MV E/E' RATIO (AVERAGED): 16.6
ECHO MV E/E' SEPTAL: 16.6
ECHO RA VOLUME: 53 ML
ECHO RA VOLUME: 54 ML
ECHO RV TAPSE: 2.7 CM (ref 1.7–?)
ECHO TV REGURGITANT MAX VELOCITY: 2.43 M/S
ECHO TV REGURGITANT PEAK GRADIENT: 24 MMHG
EKG ATRIAL RATE: 91 BPM
EKG DIAGNOSIS: NORMAL
EKG P AXIS: 40 DEGREES
EKG P-R INTERVAL: 146 MS
EKG Q-T INTERVAL: 422 MS
EKG QRS DURATION: 82 MS
EKG QTC CALCULATION (BAZETT): 519 MS
EKG R AXIS: 36 DEGREES
EKG T AXIS: 172 DEGREES
EKG VENTRICULAR RATE: 91 BPM
EOSINOPHIL # BLD: 0.2 K/UL (ref 0–0.4)
EOSINOPHIL NFR BLD: 2 % (ref 0–7)
ERYTHROCYTE [DISTWIDTH] IN BLOOD BY AUTOMATED COUNT: 14.4 % (ref 11.5–14.5)
GLOBULIN SER CALC-MCNC: 3.7 G/DL (ref 2–4)
GLUCOSE SERPL-MCNC: 131 MG/DL (ref 65–100)
HCT VFR BLD AUTO: 27.9 % (ref 35–47)
HEMOCCULT STL QL: NEGATIVE
HGB BLD-MCNC: 9.2 G/DL (ref 11.5–16)
IMM GRANULOCYTES # BLD AUTO: 0.1 K/UL (ref 0–0.04)
IMM GRANULOCYTES NFR BLD AUTO: 1 % (ref 0–0.5)
LYMPHOCYTES # BLD: 2 K/UL (ref 0.8–3.5)
LYMPHOCYTES NFR BLD: 23 % (ref 12–49)
MCH RBC QN AUTO: 30.8 PG (ref 26–34)
MCHC RBC AUTO-ENTMCNC: 33 G/DL (ref 30–36.5)
MCV RBC AUTO: 93.3 FL (ref 80–99)
MONOCYTES # BLD: 0.7 K/UL (ref 0–1)
MONOCYTES NFR BLD: 8 % (ref 5–13)
NEUTS SEG # BLD: 5.7 K/UL (ref 1.8–8)
NEUTS SEG NFR BLD: 65 % (ref 32–75)
NRBC # BLD: 0 K/UL (ref 0–0.01)
NRBC BLD-RTO: 0 PER 100 WBC
PLATELET # BLD AUTO: 291 K/UL (ref 150–400)
PMV BLD AUTO: 9.1 FL (ref 8.9–12.9)
POTASSIUM SERPL-SCNC: 3.9 MMOL/L (ref 3.5–5.1)
PROT SERPL-MCNC: 7.7 G/DL (ref 6.4–8.2)
RBC # BLD AUTO: 2.99 M/UL (ref 3.8–5.2)
SODIUM SERPL-SCNC: 140 MMOL/L (ref 136–145)
TROPONIN I SERPL HS-MCNC: 296 NG/L (ref 0–51)
WBC # BLD AUTO: 8.6 K/UL (ref 3.6–11)

## 2023-06-07 PROCEDURE — 85025 COMPLETE CBC W/AUTO DIFF WBC: CPT

## 2023-06-07 PROCEDURE — 93005 ELECTROCARDIOGRAM TRACING: CPT | Performed by: STUDENT IN AN ORGANIZED HEALTH CARE EDUCATION/TRAINING PROGRAM

## 2023-06-07 PROCEDURE — G0378 HOSPITAL OBSERVATION PER HR: HCPCS

## 2023-06-07 PROCEDURE — 82272 OCCULT BLD FECES 1-3 TESTS: CPT

## 2023-06-07 PROCEDURE — 84484 ASSAY OF TROPONIN QUANT: CPT

## 2023-06-07 PROCEDURE — 2580000003 HC RX 258: Performed by: STUDENT IN AN ORGANIZED HEALTH CARE EDUCATION/TRAINING PROGRAM

## 2023-06-07 PROCEDURE — 6370000000 HC RX 637 (ALT 250 FOR IP): Performed by: STUDENT IN AN ORGANIZED HEALTH CARE EDUCATION/TRAINING PROGRAM

## 2023-06-07 PROCEDURE — 36415 COLL VENOUS BLD VENIPUNCTURE: CPT

## 2023-06-07 PROCEDURE — C9113 INJ PANTOPRAZOLE SODIUM, VIA: HCPCS | Performed by: STUDENT IN AN ORGANIZED HEALTH CARE EDUCATION/TRAINING PROGRAM

## 2023-06-07 PROCEDURE — 93306 TTE W/DOPPLER COMPLETE: CPT

## 2023-06-07 PROCEDURE — 80053 COMPREHEN METABOLIC PANEL: CPT

## 2023-06-07 PROCEDURE — 99285 EMERGENCY DEPT VISIT HI MDM: CPT

## 2023-06-07 PROCEDURE — 6360000002 HC RX W HCPCS: Performed by: STUDENT IN AN ORGANIZED HEALTH CARE EDUCATION/TRAINING PROGRAM

## 2023-06-07 PROCEDURE — 70450 CT HEAD/BRAIN W/O DYE: CPT

## 2023-06-07 RX ORDER — ATORVASTATIN CALCIUM 40 MG/1
40 TABLET, FILM COATED ORAL NIGHTLY
Status: DISCONTINUED | OUTPATIENT
Start: 2023-06-07 | End: 2023-06-09 | Stop reason: HOSPADM

## 2023-06-07 RX ORDER — ACETAMINOPHEN 650 MG/1
650 SUPPOSITORY RECTAL EVERY 6 HOURS PRN
Status: DISCONTINUED | OUTPATIENT
Start: 2023-06-07 | End: 2023-06-09 | Stop reason: HOSPADM

## 2023-06-07 RX ORDER — SODIUM CHLORIDE 0.9 % (FLUSH) 0.9 %
5-40 SYRINGE (ML) INJECTION EVERY 12 HOURS SCHEDULED
Status: DISCONTINUED | OUTPATIENT
Start: 2023-06-07 | End: 2023-06-09 | Stop reason: HOSPADM

## 2023-06-07 RX ORDER — MORPHINE SULFATE 2 MG/ML
1 INJECTION, SOLUTION INTRAMUSCULAR; INTRAVENOUS EVERY 6 HOURS PRN
Status: DISCONTINUED | OUTPATIENT
Start: 2023-06-07 | End: 2023-06-09 | Stop reason: HOSPADM

## 2023-06-07 RX ORDER — SODIUM CHLORIDE 9 MG/ML
INJECTION, SOLUTION INTRAVENOUS CONTINUOUS
Status: DISCONTINUED | OUTPATIENT
Start: 2023-06-07 | End: 2023-06-09 | Stop reason: HOSPADM

## 2023-06-07 RX ORDER — MECLIZINE HYDROCHLORIDE 25 MG/1
12.5 TABLET ORAL 3 TIMES DAILY PRN
Status: DISCONTINUED | OUTPATIENT
Start: 2023-06-07 | End: 2023-06-09 | Stop reason: HOSPADM

## 2023-06-07 RX ORDER — POLYETHYLENE GLYCOL 3350 17 G/17G
17 POWDER, FOR SOLUTION ORAL DAILY PRN
Status: DISCONTINUED | OUTPATIENT
Start: 2023-06-07 | End: 2023-06-09 | Stop reason: HOSPADM

## 2023-06-07 RX ORDER — 0.9 % SODIUM CHLORIDE 0.9 %
1000 INTRAVENOUS SOLUTION INTRAVENOUS ONCE
Status: COMPLETED | OUTPATIENT
Start: 2023-06-07 | End: 2023-06-07

## 2023-06-07 RX ORDER — ACETAMINOPHEN 325 MG/1
650 TABLET ORAL EVERY 6 HOURS PRN
Status: DISCONTINUED | OUTPATIENT
Start: 2023-06-07 | End: 2023-06-09 | Stop reason: HOSPADM

## 2023-06-07 RX ORDER — ATORVASTATIN CALCIUM 40 MG/1
40 TABLET, FILM COATED ORAL NIGHTLY
Status: DISCONTINUED | OUTPATIENT
Start: 2023-06-07 | End: 2023-06-07

## 2023-06-07 RX ORDER — ENOXAPARIN SODIUM 100 MG/ML
40 INJECTION SUBCUTANEOUS DAILY
Status: DISCONTINUED | OUTPATIENT
Start: 2023-06-07 | End: 2023-06-09 | Stop reason: HOSPADM

## 2023-06-07 RX ORDER — ASPIRIN 81 MG/1
81 TABLET, CHEWABLE ORAL DAILY
Status: DISCONTINUED | OUTPATIENT
Start: 2023-06-07 | End: 2023-06-07

## 2023-06-07 RX ORDER — SODIUM CHLORIDE, SODIUM LACTATE, POTASSIUM CHLORIDE, AND CALCIUM CHLORIDE .6; .31; .03; .02 G/100ML; G/100ML; G/100ML; G/100ML
2000 INJECTION, SOLUTION INTRAVENOUS ONCE
Status: DISCONTINUED | OUTPATIENT
Start: 2023-06-07 | End: 2023-06-09 | Stop reason: HOSPADM

## 2023-06-07 RX ORDER — SODIUM CHLORIDE 9 MG/ML
INJECTION, SOLUTION INTRAVENOUS PRN
Status: DISCONTINUED | OUTPATIENT
Start: 2023-06-07 | End: 2023-06-09 | Stop reason: HOSPADM

## 2023-06-07 RX ORDER — ONDANSETRON 4 MG/1
4 TABLET, ORALLY DISINTEGRATING ORAL EVERY 8 HOURS PRN
Status: DISCONTINUED | OUTPATIENT
Start: 2023-06-07 | End: 2023-06-09 | Stop reason: HOSPADM

## 2023-06-07 RX ORDER — ASPIRIN 81 MG/1
162 TABLET ORAL PRN
Status: ON HOLD | COMMUNITY
End: 2023-06-09 | Stop reason: HOSPADM

## 2023-06-07 RX ORDER — AMLODIPINE BESYLATE 5 MG/1
5 TABLET ORAL DAILY
Status: DISCONTINUED | OUTPATIENT
Start: 2023-06-07 | End: 2023-06-09 | Stop reason: HOSPADM

## 2023-06-07 RX ORDER — CHLORAL HYDRATE 500 MG
1 CAPSULE ORAL DAILY
COMMUNITY

## 2023-06-07 RX ORDER — ATORVASTATIN CALCIUM 10 MG/1
10 TABLET, FILM COATED ORAL DAILY
COMMUNITY

## 2023-06-07 RX ORDER — SODIUM CHLORIDE 0.9 % (FLUSH) 0.9 %
5-40 SYRINGE (ML) INJECTION PRN
Status: DISCONTINUED | OUTPATIENT
Start: 2023-06-07 | End: 2023-06-09 | Stop reason: HOSPADM

## 2023-06-07 RX ORDER — ONDANSETRON 2 MG/ML
4 INJECTION INTRAMUSCULAR; INTRAVENOUS EVERY 6 HOURS PRN
Status: DISCONTINUED | OUTPATIENT
Start: 2023-06-07 | End: 2023-06-09 | Stop reason: HOSPADM

## 2023-06-07 RX ORDER — METHOCARBAMOL 500 MG/1
500 TABLET, FILM COATED ORAL 3 TIMES DAILY
Status: DISCONTINUED | OUTPATIENT
Start: 2023-06-07 | End: 2023-06-09 | Stop reason: HOSPADM

## 2023-06-07 RX ADMIN — SODIUM CHLORIDE, PRESERVATIVE FREE 40 MG: 5 INJECTION INTRAVENOUS at 17:46

## 2023-06-07 RX ADMIN — SODIUM CHLORIDE 1000 ML: 9 INJECTION, SOLUTION INTRAVENOUS at 09:13

## 2023-06-07 RX ADMIN — ATORVASTATIN CALCIUM 40 MG: 40 TABLET, FILM COATED ORAL at 20:10

## 2023-06-07 RX ADMIN — SODIUM CHLORIDE: 9 INJECTION, SOLUTION INTRAVENOUS at 19:24

## 2023-06-07 RX ADMIN — METHOCARBAMOL TABLETS 500 MG: 500 TABLET, COATED ORAL at 20:10

## 2023-06-07 RX ADMIN — METHOCARBAMOL TABLETS 500 MG: 500 TABLET, COATED ORAL at 12:39

## 2023-06-07 RX ADMIN — MORPHINE SULFATE 1 MG: 2 INJECTION, SOLUTION INTRAMUSCULAR; INTRAVENOUS at 20:10

## 2023-06-07 RX ADMIN — SODIUM CHLORIDE, PRESERVATIVE FREE 10 ML: 5 INJECTION INTRAVENOUS at 20:10

## 2023-06-07 ASSESSMENT — LIFESTYLE VARIABLES
HOW OFTEN DO YOU HAVE A DRINK CONTAINING ALCOHOL: NEVER
HOW MANY STANDARD DRINKS CONTAINING ALCOHOL DO YOU HAVE ON A TYPICAL DAY: PATIENT DOES NOT DRINK

## 2023-06-07 ASSESSMENT — PAIN SCALES - GENERAL
PAINLEVEL_OUTOF10: 3
PAINLEVEL_OUTOF10: 5

## 2023-06-07 ASSESSMENT — PAIN - FUNCTIONAL ASSESSMENT: PAIN_FUNCTIONAL_ASSESSMENT: 0-10

## 2023-06-07 NOTE — CONSULTS
[facility]  [address] [facility], [address] [place] [address]  [phone]  WWW. [employer]       CARDIOLOGY CONSULTATION       Date of  Admission: 6/7/2023  8:13 AM     Admission type:Emergency   Primary Care Physician:CHANEL Shepherd NP     Attending Provider: Venkat Tariq MD  Cardiology Provider: None    CC/REASON FOR CONSULT: Elevated troponin     Subjective:     Misael Jain is a 70 y.o. female admitted for Syncope and collapse [R55]  Lightheadedness [R42]  Elevated troponin [R77.8]  Anemia, unspecified type [D64.9]. The patient was seen and examined at the bedside. She was in her usual state of health about a week ago when she started noticing dark stools. She takes amlodipine 5 mg twice a day for her blood pressure. The patient experienced lightheadedness last night associated with near passing out episode. She denies any chest pain/pressure/heaviness or tightness. She denies any shortness of breath. No other complaints prior to these. In the hospital, the patient was noted to have hemoglobin less than 9.2 g/dL (prior level more than 14.9 g/dL), although stool occult blood test is negative. Twelve-lead electrocardiogram with T wave inversions and troponin is elevated at 296 ng/L. Patient Active Problem List    Diagnosis Date Noted    Elevated troponin 06/07/2023    Spondylosis of cervical region without myelopathy or radiculopathy 09/14/2018    Mixed hyperlipidemia 10/31/2017    Chest pain 02/27/2014    HTN (hypertension), benign 02/27/2014    Hypercholesteremia 02/27/2014      CHANEL Llanos NP  Past Medical History:   Diagnosis Date    Hypertension       Social History     Socioeconomic History    Marital status:    Tobacco Use    Smoking status: Never    Smokeless tobacco: Never   Substance and Sexual Activity    Alcohol use: Yes    Drug use: No     No Known Allergies   No family history on file.    Current Facility-Administered

## 2023-06-07 NOTE — ED NOTES
TRANSFER - OUT REPORT:    Verbal report given to Nancy IRAHETA on Sanjuanita Felipe  being transferred to [facility] for routine progression of patient care       Report consisted of patient's Situation, Background, Assessment and   Recommendations(SBAR). Information from the following report(s) Nurse Handoff Report, ED Encounter Summary, ED SBAR, MAR, Recent Results, and Cardiac Rhythm NSR  was reviewed with the receiving nurse. [place] Assessment: Presents to emergency department  because of falls (Syncope, seizure, or loss of consciousness): Yes, Age > 79: No, Altered Mental Status, Intoxication with alcohol or substance confusion (Disorientation, impaired judgment, poor safety awaremess, or inability to follow instructions): No, Impaired Mobility: Ambulates or transfers with assistive devices or assistance; Unable to ambulate or transer.: No, Nursing Judgement: Yes  Lines:   Peripheral IV 06/07/23 Left;Proximal Forearm (Active)        Opportunity for questions and clarification was provided.       Patient transported with:  Registered Nurse          Melissa Mayen RN  06/07/23 9420

## 2023-06-07 NOTE — H&P
Hospitalist Admission Note    NAME:   Medardo Conteh   : 1960   MRN: [id number]     Date/Time: 2023 11:45 AM    Patient PCP: CHANEL Collins NP    ______________________________________________________________________  Given the patient's current clinical presentation, I have a high level of concern for decompensation if discharged from the emergency department. Complex decision making was performed, which includes reviewing the patient's available past medical records, laboratory results, and x-ray films. My assessment of this patient's clinical condition and my plan of care is as follows. Assessment / Plan:    Dizziness/leg cramps  CT head unremarkable for acute process  We will get MRI  Meclizine for dizziness    Elevated troponin versus type II MI  Prolonged QT  Syncope  No active chest pain  We will trend troponin  T wave inversion, repeat EKG every 2 hours x3  We will consult cardiology  We will get echo  Aspirin and statin till CAD rule out  Avoid medication that prolonged QT    Hypertension  Continue with home medication    Dark stool ? No melena or fresh blood on exam  We will get FOBT  Trend hemoglobin every 12 hour    Medical Decision Making:   I personally reviewed labs: CBC, BMP  I personally reviewed imaging: CT scan  Toxic drug monitoring: CCB  Discussed case with: ED provider. After discussion I am in agreement that acuity of patient's medical condition necessitates hospital stay. Code Status: Full code  DVT Prophylaxis: Lovenox will start tomorrow, if FOBT negative  GI Prophylaxis: None  Baseline:  Independent    Subjective:   CHIEF COMPLAINT: Dizziness and leg cramps for 3 days    HISTORY OF PRESENT ILLNESS:     Medardo Conteh is a 70 y.o.  female with PMHx significant for pretension for which she takes amlodipine, patient also take medication for leg cramps at home but she ran out of that, patient came today because of dizziness which she started 3 days ago

## 2023-06-07 NOTE — ED PROVIDER NOTES
medications      amLODIPine 10 MG tablet  Commonly known as: NORVASC     ascorbic acid 500 MG tablet  Commonly known as: VITAMIN C     atorvastatin 20 MG tablet  Commonly known as: LIPITOR     methocarbamol 500 MG tablet  Commonly known as: ROBAXIN            2. No follow-up provider specified.   Return to ED if worse     Diagnosis     Clinical Impression: Acute syncope, anemia, abnormal EKG, acute elevated troponin               Ivory Ledezma MD  06/07/23 7327

## 2023-06-08 ENCOUNTER — APPOINTMENT (OUTPATIENT)
Facility: HOSPITAL | Age: 63
End: 2023-06-08
Payer: COMMERCIAL

## 2023-06-08 LAB
ANION GAP SERPL CALC-SCNC: 6 MMOL/L (ref 5–15)
BUN SERPL-MCNC: 10 MG/DL (ref 6–20)
BUN/CREAT SERPL: 12 (ref 12–20)
CALCIUM SERPL-MCNC: 8.3 MG/DL (ref 8.5–10.1)
CHLORIDE SERPL-SCNC: 111 MMOL/L (ref 97–108)
CO2 SERPL-SCNC: 26 MMOL/L (ref 21–32)
CREAT SERPL-MCNC: 0.81 MG/DL (ref 0.55–1.02)
ERYTHROCYTE [DISTWIDTH] IN BLOOD BY AUTOMATED COUNT: 14.9 % (ref 11.5–14.5)
GLUCOSE SERPL-MCNC: 113 MG/DL (ref 65–100)
HCT VFR BLD AUTO: 27.3 % (ref 35–47)
HEMOCCULT STL QL: NEGATIVE
HGB BLD-MCNC: 8.7 G/DL (ref 11.5–16)
MCH RBC QN AUTO: 30 PG (ref 26–34)
MCHC RBC AUTO-ENTMCNC: 31.9 G/DL (ref 30–36.5)
MCV RBC AUTO: 94.1 FL (ref 80–99)
NRBC # BLD: 0.02 K/UL (ref 0–0.01)
NRBC BLD-RTO: 0.3 PER 100 WBC
PLATELET # BLD AUTO: 297 K/UL (ref 150–400)
PMV BLD AUTO: 9.1 FL (ref 8.9–12.9)
POTASSIUM SERPL-SCNC: 3.6 MMOL/L (ref 3.5–5.1)
RBC # BLD AUTO: 2.9 M/UL (ref 3.8–5.2)
SODIUM SERPL-SCNC: 143 MMOL/L (ref 136–145)
TROPONIN I SERPL HS-MCNC: 222 NG/L (ref 0–51)
WBC # BLD AUTO: 7.8 K/UL (ref 3.6–11)

## 2023-06-08 PROCEDURE — 85027 COMPLETE CBC AUTOMATED: CPT

## 2023-06-08 PROCEDURE — 82272 OCCULT BLD FECES 1-3 TESTS: CPT

## 2023-06-08 PROCEDURE — 84484 ASSAY OF TROPONIN QUANT: CPT

## 2023-06-08 PROCEDURE — G0378 HOSPITAL OBSERVATION PER HR: HCPCS

## 2023-06-08 PROCEDURE — 2580000003 HC RX 258: Performed by: STUDENT IN AN ORGANIZED HEALTH CARE EDUCATION/TRAINING PROGRAM

## 2023-06-08 PROCEDURE — 80048 BASIC METABOLIC PNL TOTAL CA: CPT

## 2023-06-08 PROCEDURE — 36415 COLL VENOUS BLD VENIPUNCTURE: CPT

## 2023-06-08 PROCEDURE — 6360000002 HC RX W HCPCS: Performed by: STUDENT IN AN ORGANIZED HEALTH CARE EDUCATION/TRAINING PROGRAM

## 2023-06-08 PROCEDURE — 6370000000 HC RX 637 (ALT 250 FOR IP): Performed by: STUDENT IN AN ORGANIZED HEALTH CARE EDUCATION/TRAINING PROGRAM

## 2023-06-08 PROCEDURE — A4216 STERILE WATER/SALINE, 10 ML: HCPCS | Performed by: STUDENT IN AN ORGANIZED HEALTH CARE EDUCATION/TRAINING PROGRAM

## 2023-06-08 PROCEDURE — C9113 INJ PANTOPRAZOLE SODIUM, VIA: HCPCS | Performed by: STUDENT IN AN ORGANIZED HEALTH CARE EDUCATION/TRAINING PROGRAM

## 2023-06-08 RX ADMIN — METHOCARBAMOL TABLETS 500 MG: 500 TABLET, COATED ORAL at 13:40

## 2023-06-08 RX ADMIN — SODIUM CHLORIDE, PRESERVATIVE FREE 10 ML: 5 INJECTION INTRAVENOUS at 09:43

## 2023-06-08 RX ADMIN — SODIUM CHLORIDE: 9 INJECTION, SOLUTION INTRAVENOUS at 09:35

## 2023-06-08 RX ADMIN — SODIUM CHLORIDE, PRESERVATIVE FREE 40 MG: 5 INJECTION INTRAVENOUS at 06:51

## 2023-06-08 RX ADMIN — METHOCARBAMOL TABLETS 500 MG: 500 TABLET, COATED ORAL at 09:34

## 2023-06-08 RX ADMIN — SODIUM CHLORIDE, PRESERVATIVE FREE 10 ML: 5 INJECTION INTRAVENOUS at 21:41

## 2023-06-08 RX ADMIN — METHOCARBAMOL TABLETS 500 MG: 500 TABLET, COATED ORAL at 20:48

## 2023-06-08 RX ADMIN — ATORVASTATIN CALCIUM 40 MG: 40 TABLET, FILM COATED ORAL at 20:48

## 2023-06-08 RX ADMIN — SODIUM CHLORIDE, PRESERVATIVE FREE 40 MG: 5 INJECTION INTRAVENOUS at 16:28

## 2023-06-08 ASSESSMENT — PAIN SCALES - GENERAL
PAINLEVEL_OUTOF10: 0

## 2023-06-08 NOTE — CARE COORDINATION
Care Management Initial Assessment       RUR: NA observation status  Readmission? No  1st IM letter given? No  1st  letter given: No     06/08/23 1145   Service Assessment   Patient Orientation Alert and Oriented   Cognition Alert   History Provided By Patient   Primary Caregiver Self   Accompanied By/Relationship NA   Support Systems Spouse/Significant Other   Patient's Healthcare Decision Maker is: Patient Declined (Legal Next of Kin Remains as Decision Maker)   PCP Verified by CM Yes  (PCP was seen last year, within the last quarter)   Last Visit to PCP Within last year   Prior Functional Level Independent in ADLs/IADLs   Current Functional Level Independent in ADLs/IADLs   Can patient return to prior living arrangement Yes   Ability to make needs known: Good   Family able to assist with home care needs: Yes   Would you like for me to discuss the discharge plan with any other family members/significant others, and if so, who? No   Financial Resources None  (Expressed interest in applying for food stamps)   Community Resources None   Social/Functional History   Lives With Spouse   Type of [address] [facility] One level  (3 AGATHA)   Home Access Stairs to enter with rails   Entrance Stairs - Number of Steps 3 AGATHA   Entrance Stairs - [facility] Responsibilities No   Ambulation Assistance Independent   Transfer Assistance Independent   Active  Yes   Mode of Transportation Car   Occupation Full time employment  (The patient works at [employer])   Type of Occupation [address] Prior To Admission None   Potential Assistance Needed Other (Comment)  (expressed interest in applying to food stamp)   DME Ordered?  No   Type of Home Care Services None   Patient expects to

## 2023-06-08 NOTE — PLAN OF CARE
Problem: Discharge Planning  Goal: Discharge to home or other facility with appropriate resources  Outcome: Progressing  Flowsheets (Taken 6/8/2023 3930)  Discharge to home or other facility with appropriate resources: Identify barriers to discharge with patient and caregiver     Problem: Pain  Goal: Verbalizes/displays adequate comfort level or baseline comfort level  Outcome: Progressing     Problem: Safety - Adult  Goal: Free from fall injury  Outcome: Progressing

## 2023-06-08 NOTE — CONSULTS
GI CONSULTATION NOTE  Mart Matias, NP  [phone] NP in-hospital cell phone M-F until 4:30  After 5pm or on weekends, please call  for physician on call    NAME: Sanjuanita Felipe   :  1960   MRN:  [id number]   Attending: Dr. Priscilla De La O  Primary GI: Dr. Lety Burgos  Date/Time:  2023 10:57 AM  Assessment:   -?melena  On PO iron, dark stool started when she started that  No GI complaints  Tolerating diet  FOBT negative  Never had EGD/CLN  -Chronic Anemia  Used to think r/t menses, but sounds like hasn't had a menstrual cycle since 46  Takes PO Iron intermittently, unclear who told her to or why  Reports just started taking again about a week ago because her  told her to?  -Syncope episode    Plan:   -Needs EGD/CLN as outpatient, patient wants to go home, agree reasonable if meets discharge criteria  -Vitals stable, tolerating diet. No signs of overt bleeding. OP workup appropriate  -Diet as tolerated  -PPI daily  -Will order Iron panel, B12, and folate  -If discharged needs hemoglobin recheck with her PCP next week  -Supportive measures per primary team. Monitor for further S&S of GI bleed  -Serial H&H as clinically indicated. Goal hemoglobin >7  -Avoid NSAIDs  -Will set up with OP visit  -Will sign off for now. Please call GI with any further questions or concerns. Thank you! Plan discussed with Dr. Lety Burgos  Subjective:     HISTORY OF PRESENT ILLNESS:     Sanjuanita Felipe is an 70 y.o. female who we are asked to see for complaint of dark stool. Patient reports chronic h/o anemia. She reports she used to attribute it to irregular heavy menstruation cycles. Unclear if has had recent bleeding, but sounds like last menstrual bleeding was around age 46, 15 years ago. However reports still having underlying anemia for which she has managed with PO iron supplementation.  When asked who recommended she take it and how they determine when to start and stop, she reports that at one point a

## 2023-06-09 VITALS
SYSTOLIC BLOOD PRESSURE: 134 MMHG | BODY MASS INDEX: 23.39 KG/M2 | HEART RATE: 90 BPM | HEIGHT: 64 IN | DIASTOLIC BLOOD PRESSURE: 72 MMHG | TEMPERATURE: 97.5 F | WEIGHT: 137 LBS | OXYGEN SATURATION: 96 % | RESPIRATION RATE: 16 BRPM

## 2023-06-09 LAB
ANION GAP SERPL CALC-SCNC: 5 MMOL/L (ref 5–15)
BASOPHILS # BLD: 0 K/UL (ref 0–0.1)
BASOPHILS NFR BLD: 1 % (ref 0–1)
BUN SERPL-MCNC: 11 MG/DL (ref 6–20)
BUN/CREAT SERPL: 14 (ref 12–20)
CALCIUM SERPL-MCNC: 8.2 MG/DL (ref 8.5–10.1)
CHLORIDE SERPL-SCNC: 109 MMOL/L (ref 97–108)
CO2 SERPL-SCNC: 26 MMOL/L (ref 21–32)
CREAT SERPL-MCNC: 0.78 MG/DL (ref 0.55–1.02)
DIFFERENTIAL METHOD BLD: ABNORMAL
EOSINOPHIL # BLD: 0.3 K/UL (ref 0–0.4)
EOSINOPHIL NFR BLD: 4 % (ref 0–7)
ERYTHROCYTE [DISTWIDTH] IN BLOOD BY AUTOMATED COUNT: 14.6 % (ref 11.5–14.5)
FERRITIN SERPL-MCNC: 93 NG/ML (ref 8–252)
FOLATE SERPL-MCNC: 35.8 NG/ML (ref 5–21)
GLUCOSE SERPL-MCNC: 114 MG/DL (ref 65–100)
HCT VFR BLD AUTO: 27.1 % (ref 35–47)
HCT VFR BLD AUTO: 27.5 % (ref 35–47)
HGB BLD-MCNC: 8.9 G/DL (ref 11.5–16)
HGB BLD-MCNC: 8.9 G/DL (ref 11.5–16)
IMM GRANULOCYTES # BLD AUTO: 0 K/UL (ref 0–0.04)
IMM GRANULOCYTES NFR BLD AUTO: 1 % (ref 0–0.5)
IRON SATN MFR SERPL: 9 % (ref 20–50)
IRON SERPL-MCNC: 29 UG/DL (ref 35–150)
LYMPHOCYTES # BLD: 2.4 K/UL (ref 0.8–3.5)
LYMPHOCYTES NFR BLD: 30 % (ref 12–49)
MCH RBC QN AUTO: 30.8 PG (ref 26–34)
MCHC RBC AUTO-ENTMCNC: 32.8 G/DL (ref 30–36.5)
MCV RBC AUTO: 93.8 FL (ref 80–99)
MONOCYTES # BLD: 0.6 K/UL (ref 0–1)
MONOCYTES NFR BLD: 8 % (ref 5–13)
NEUTS SEG # BLD: 4.5 K/UL (ref 1.8–8)
NEUTS SEG NFR BLD: 56 % (ref 32–75)
NRBC # BLD: 0.02 K/UL (ref 0–0.01)
NRBC BLD-RTO: 0.3 PER 100 WBC
PLATELET # BLD AUTO: 306 K/UL (ref 150–400)
PMV BLD AUTO: 9 FL (ref 8.9–12.9)
POTASSIUM SERPL-SCNC: 3.5 MMOL/L (ref 3.5–5.1)
RBC # BLD AUTO: 2.89 M/UL (ref 3.8–5.2)
SODIUM SERPL-SCNC: 140 MMOL/L (ref 136–145)
TIBC SERPL-MCNC: 319 UG/DL (ref 250–450)
VIT B12 SERPL-MCNC: 880 PG/ML (ref 193–986)
WBC # BLD AUTO: 7.9 K/UL (ref 3.6–11)

## 2023-06-09 PROCEDURE — 6360000002 HC RX W HCPCS: Performed by: STUDENT IN AN ORGANIZED HEALTH CARE EDUCATION/TRAINING PROGRAM

## 2023-06-09 PROCEDURE — 83550 IRON BINDING TEST: CPT

## 2023-06-09 PROCEDURE — 2580000003 HC RX 258: Performed by: STUDENT IN AN ORGANIZED HEALTH CARE EDUCATION/TRAINING PROGRAM

## 2023-06-09 PROCEDURE — 82728 ASSAY OF FERRITIN: CPT

## 2023-06-09 PROCEDURE — 83540 ASSAY OF IRON: CPT

## 2023-06-09 PROCEDURE — A4216 STERILE WATER/SALINE, 10 ML: HCPCS | Performed by: STUDENT IN AN ORGANIZED HEALTH CARE EDUCATION/TRAINING PROGRAM

## 2023-06-09 PROCEDURE — C9113 INJ PANTOPRAZOLE SODIUM, VIA: HCPCS | Performed by: STUDENT IN AN ORGANIZED HEALTH CARE EDUCATION/TRAINING PROGRAM

## 2023-06-09 PROCEDURE — 85018 HEMOGLOBIN: CPT

## 2023-06-09 PROCEDURE — 85025 COMPLETE CBC W/AUTO DIFF WBC: CPT

## 2023-06-09 PROCEDURE — 80048 BASIC METABOLIC PNL TOTAL CA: CPT

## 2023-06-09 PROCEDURE — G0378 HOSPITAL OBSERVATION PER HR: HCPCS

## 2023-06-09 PROCEDURE — 82607 VITAMIN B-12: CPT

## 2023-06-09 PROCEDURE — 6370000000 HC RX 637 (ALT 250 FOR IP): Performed by: STUDENT IN AN ORGANIZED HEALTH CARE EDUCATION/TRAINING PROGRAM

## 2023-06-09 PROCEDURE — 85014 HEMATOCRIT: CPT

## 2023-06-09 PROCEDURE — 36415 COLL VENOUS BLD VENIPUNCTURE: CPT

## 2023-06-09 PROCEDURE — 82746 ASSAY OF FOLIC ACID SERUM: CPT

## 2023-06-09 RX ORDER — AMLODIPINE BESYLATE 2.5 MG/1
5 TABLET ORAL 2 TIMES DAILY
Qty: 30 TABLET | Refills: 1 | Status: SHIPPED | OUTPATIENT
Start: 2023-06-09

## 2023-06-09 RX ORDER — MECLIZINE HCL 12.5 MG/1
12.5 TABLET ORAL 3 TIMES DAILY PRN
Qty: 20 TABLET | Refills: 0 | Status: SHIPPED | OUTPATIENT
Start: 2023-06-09 | End: 2023-06-19

## 2023-06-09 RX ADMIN — METHOCARBAMOL TABLETS 500 MG: 500 TABLET, COATED ORAL at 10:26

## 2023-06-09 RX ADMIN — SODIUM CHLORIDE, PRESERVATIVE FREE 40 MG: 5 INJECTION INTRAVENOUS at 03:45

## 2023-06-09 ASSESSMENT — PAIN SCALES - GENERAL: PAINLEVEL_OUTOF10: 0

## 2023-06-09 ASSESSMENT — PAIN - FUNCTIONAL ASSESSMENT: PAIN_FUNCTIONAL_ASSESSMENT: ACTIVITIES ARE NOT PREVENTED

## 2023-06-09 NOTE — CARE COORDINATION
Patient is clear from a CM standpoint. Transition of Care Plan: Home with follow ups. RUR: N/A Obs./VOON provided. Prior Level of Functioning: Independent  Disposition: Home with follow ups. If SNF or IPR: Date FOC offered: N/A  Date FOC received: N/A  Accepting facility: N/A  Date authorization started with reference number: N/A  Date authorization received and expires: N/A  Follow up appointments: PCP/specialists if needed. DME needed: None. Transportation at discharge:   IM/IMM Medicare/ letter given: N/A Obs. Is patient a [place] and connected with [facility]? No.   If yes, was [employer] transfer form completed and VA notified? N/A  Caregiver Contact: Elana Nascimento - spouse - [phone]  Discharge Caregiver contacted prior to discharge? Patient to contact. Care Conference needed? No.  Barriers to discharge:  None. 1151 - CM notified primary RN that patient is clear from a CM standpoint.      06/09/23 7407 [facility] Discharge   Transition of Care Consult (CM Consult) N/A   Services At/After Discharge None   Mode of Transport at Discharge Other (see comment)  ()   Confirm Follow Up Transport Self       ALTHEA Davis, RN    Care Management  [phone]

## 2023-06-09 NOTE — PROGRESS NOTES
1253- TRANSFER - IN REPORT:    [facility] [employer]  being received from ER for routine progression of patient care      Report consisted of patient's Situation, Background, Assessment and   Recommendations(SBAR). Information from the following report(s) Nurse Handoff Report, Index, ED SBAR, Intake/Output, MAR, and Recent Results was reviewed with the receiving nurse. Opportunity for questions and clarification was provided. Assessment completed upon patient's arrival to unit and care assumed. 1930- Bedside and Verbal shift change report given to Rachel Bennett (oncoming nurse) by Judith Porras (offgoing nurse). Report included the following information Nurse Handoff Report, Index, Intake/Output, MAR, and Recent Results.
1314 - PCP hospital follow-up transitional care appointment has been scheduled with OLLIE Owen on 6/20/23 at 1000. Pending patient discharge. Gloria Pena Care Management Assistant     Attempted to schedule PCP hospital follow up appointment. Unable to reach anyone, unable to leave voicemail. Phone line is busy. Pending patient discharge.  Gloria Pena Care Management Assistant
Bedside and Verbal shift change report given to [facility] (oncoming nurse) by Joselin Hopkins RN (offgoing nurse). Report included the following information Nurse Handoff Report, Index, MAR, Recent Results, and Cardiac Rhythm Sinus Rhythm . End of Shift Note    Bedside shift change report given to Neelima Scott RN (oncoming nurse) by Divya Peres RN (offgoing nurse). Report included the following information SBAR, MAR, Recent Results, and Cardiac Rhythm Sinus Rhythm    Shift worked:  7p-7a     Shift summary and any significant changes:     IV removed d/t infiltration.  Swelling has subsided     Concerns for physician to address:       Zone phone for oncoming shift:            Divya Peres RN
Called floor and spoke to Charge Nurse about getting MRI screening sheet signed so that scan can be done
MRI PENDING    Completion of MRI Screening Sheet    Fax to [phone] when completed    Please call [phone]  When this is done    Thank You MRI PENDING    Completion of MRI Screening Sheet    Fax to [phone] when completed    Please call [phone]  When this is done    Thank You
Patient refusing MRI at this time per Nurse
SAMBUCOL BLACK ELDERBERRY PO 6/6/2023 Self Yes    Sig: Take 1 tablet by mouth daily   Trolamine Salicylate (ASPERCREME ORIGINAL EX)  Self Yes    Sig: Apply topically as needed (pain in knees and shoulder)   amLODIPine (NORVASC) 10 MG tablet 6/7/2023 at 0700 Self Yes    Sig: Take 0.5 tablets by mouth in the morning and at bedtime   ascorbic acid (VITAMIN C) 500 MG tablet 6/7/2023 at 0700 Self Yes    Sig: Take 0.5 tablets by mouth 2 times daily   aspirin 81 MG EC tablet  Self Yes    Sig: Take 2 tablets by mouth as needed for Pain   atorvastatin (LIPITOR) 10 MG tablet 6/2/2023 Self Yes    Sig: Take 1 tablet by mouth daily      Facility-Administered Medications: None        Thank you,  SHARIFA KESSLER, [facility]
bed alarm refusal form signed and in patient chart       Length of Stay:  Expected LOS: 2  Actual LOS: Berna Lauren RN
________________________________________________________________________  Alok Pham MD     Procedures: see electronic medical records for all procedures/Xrays and details which were not copied into this note but were reviewed prior to creation of Plan. LABS:  I reviewed today's most current labs and imaging studies.   Pertinent labs include:  Recent Labs     06/07/23  0844 06/08/23  0401   WBC 8.6 7.8   HGB 9.2* 8.7*   HCT 27.9* 27.3*    297     Recent Labs     06/07/23  0844 06/08/23  0401    143   K 3.9 3.6    111*   CO2 30 26   GLUCOSE 131* 113*   BUN 10 10   CREATININE 0.83 0.81   CALCIUM 8.8 8.3*   LABALBU 4.0  --    BILITOT 0.4  --    AST 20  --    ALT 40  --        Signed: Alok Pham MD

## 2023-06-09 NOTE — DISCHARGE SUMMARY
for benefits: [phone], [email or web address]  -Fax paper application to: [phone]  -Drop Box at [facility] ([facility]); Drop Box at [facility] [employer] [facility] ([address] [place] [address])    CHANEL Manuel NP  [place]  [address] [address]  [phone]    Go on 6/20/2023  at 10:00 a.m. for your PCP hospital up.     CHANEL Manuel NP [place]  [address] [place] [address]          Miles Conway MD  [address]  [phone]    Schedule an appointment as soon as possible for a visit in 1 week(s)      Hollie Sharma MD  [address] [facility] [phone]    Schedule an appointment as soon as possible for a visit in 1 week(s)            Total time in minutes spent coordinating this discharge (includes going over instructions, follow-up, prescriptions, and preparing report for sign off to her PCP) :  35 minutes

## 2023-06-09 NOTE — DISCHARGE INSTRUCTIONS
Date/Time                                                                                                                                              Patient or Representative Signature                                                          Date/Time

## 2023-06-09 NOTE — PLAN OF CARE
Problem: Discharge Planning  Goal: Discharge to home or other facility with appropriate resources  6/8/2023 2247 by Mariana Pepe RN  Outcome: Progressing  6/8/2023 1405 by Manoj Radford RN  Outcome: Progressing  Flowsheets (Taken 6/8/2023 0730)  Discharge to home or other facility with appropriate resources: Identify barriers to discharge with patient and caregiver     Problem: Pain  Goal: Verbalizes/displays adequate comfort level or baseline comfort level  6/8/2023 2247 by Mariana Pepe RN  Outcome: Progressing  6/8/2023 1405 by Manoj Radford RN  Outcome: Progressing     Problem: Safety - Adult  Goal: Free from fall injury  6/8/2023 2247 by Mariana Pepe RN  Outcome: Progressing  6/8/2023 1405 by Manoj Radford RN  Outcome: Progressing

## 2023-06-12 RX ORDER — ATORVASTATIN CALCIUM 20 MG/1
TABLET, FILM COATED ORAL
Qty: 90 TABLET | Refills: 0 | OUTPATIENT
Start: 2023-06-12

## 2023-06-20 ENCOUNTER — OFFICE VISIT (OUTPATIENT)
Facility: CLINIC | Age: 63
End: 2023-06-20
Payer: COMMERCIAL

## 2023-06-20 VITALS
BODY MASS INDEX: 23.56 KG/M2 | HEART RATE: 83 BPM | OXYGEN SATURATION: 98 % | SYSTOLIC BLOOD PRESSURE: 142 MMHG | TEMPERATURE: 97.2 F | WEIGHT: 138 LBS | DIASTOLIC BLOOD PRESSURE: 71 MMHG | RESPIRATION RATE: 18 BRPM | HEIGHT: 64 IN

## 2023-06-20 DIAGNOSIS — R42 DIZZINESS: ICD-10-CM

## 2023-06-20 DIAGNOSIS — E83.51 HYPOCALCEMIA: ICD-10-CM

## 2023-06-20 DIAGNOSIS — Z11.4 SCREENING FOR HIV WITHOUT PRESENCE OF RISK FACTORS: ICD-10-CM

## 2023-06-20 DIAGNOSIS — K92.1 MELENA: ICD-10-CM

## 2023-06-20 DIAGNOSIS — I10 ESSENTIAL (PRIMARY) HYPERTENSION: ICD-10-CM

## 2023-06-20 DIAGNOSIS — Z76.89 ENCOUNTER FOR SUPPORT AND COORDINATION OF TRANSITION OF CARE: ICD-10-CM

## 2023-06-20 DIAGNOSIS — D50.8 IRON DEFICIENCY ANEMIA SECONDARY TO INADEQUATE DIETARY IRON INTAKE: ICD-10-CM

## 2023-06-20 DIAGNOSIS — Z91.148 NON COMPLIANCE W MEDICATION REGIMEN: ICD-10-CM

## 2023-06-20 DIAGNOSIS — R55 NEAR SYNCOPE: Primary | ICD-10-CM

## 2023-06-20 DIAGNOSIS — T46.6X5A ADVERSE REACTION TO STATIN MEDICATION: ICD-10-CM

## 2023-06-20 DIAGNOSIS — E78.2 MIXED HYPERLIPIDEMIA: ICD-10-CM

## 2023-06-20 DIAGNOSIS — Z11.59 NEED FOR HEPATITIS C SCREENING TEST: ICD-10-CM

## 2023-06-20 PROCEDURE — 1124F ACP DISCUSS-NO DSCNMKR DOCD: CPT | Performed by: NURSE PRACTITIONER

## 2023-06-20 PROCEDURE — 3077F SYST BP >= 140 MM HG: CPT | Performed by: NURSE PRACTITIONER

## 2023-06-20 PROCEDURE — 99214 OFFICE O/P EST MOD 30 MIN: CPT | Performed by: NURSE PRACTITIONER

## 2023-06-20 PROCEDURE — 3078F DIAST BP <80 MM HG: CPT | Performed by: NURSE PRACTITIONER

## 2023-06-20 RX ORDER — MAGNESIUM 30 MG
30 TABLET ORAL 2 TIMES DAILY
COMMUNITY

## 2023-06-20 RX ORDER — AMLODIPINE BESYLATE 2.5 MG/1
2.5 TABLET ORAL DAILY
Qty: 30 TABLET | Refills: 2 | Status: SHIPPED | OUTPATIENT
Start: 2023-06-20

## 2023-06-20 SDOH — ECONOMIC STABILITY: FOOD INSECURITY: WITHIN THE PAST 12 MONTHS, THE FOOD YOU BOUGHT JUST DIDN'T LAST AND YOU DIDN'T HAVE MONEY TO GET MORE.: NEVER TRUE

## 2023-06-20 SDOH — ECONOMIC STABILITY: HOUSING INSECURITY
IN THE LAST 12 MONTHS, WAS THERE A TIME WHEN YOU DID NOT HAVE A STEADY PLACE TO SLEEP OR SLEPT IN A SHELTER (INCLUDING NOW)?: NO

## 2023-06-20 SDOH — ECONOMIC STABILITY: INCOME INSECURITY: HOW HARD IS IT FOR YOU TO PAY FOR THE VERY BASICS LIKE FOOD, HOUSING, MEDICAL CARE, AND HEATING?: NOT HARD AT ALL

## 2023-06-20 SDOH — ECONOMIC STABILITY: FOOD INSECURITY: WITHIN THE PAST 12 MONTHS, YOU WORRIED THAT YOUR FOOD WOULD RUN OUT BEFORE YOU GOT MONEY TO BUY MORE.: NEVER TRUE

## 2023-06-20 ASSESSMENT — PATIENT HEALTH QUESTIONNAIRE - PHQ9
SUM OF ALL RESPONSES TO PHQ QUESTIONS 1-9: 0
1. LITTLE INTEREST OR PLEASURE IN DOING THINGS: 0
2. FEELING DOWN, DEPRESSED OR HOPELESS: 0
SUM OF ALL RESPONSES TO PHQ QUESTIONS 1-9: 0
SUM OF ALL RESPONSES TO PHQ9 QUESTIONS 1 & 2: 0

## 2023-06-20 NOTE — PROGRESS NOTES
Emmy Doyle 1960 is a 70 y.o. female, Established patient, here for evaluation of the following chief complaint(s):  Follow-Up from Hospital      ASSESSMENT/PLAN:  Below is the assessment and plan developed based on review of pertinent history, physical exam, labs, studies, and medications. 1. Near syncope  Resolved, no further episodes. Head CT normal, pt refused MRI. Referred to NEURO for consultation    2. Hypocalcemia  Stable, monitor. Dietary supplementation  - Basic Metabolic Panel; Future    3. Mixed hyperlipidemia  Stable, monitor. Pending labs may switch to pravastatin or lovastatin for reported leg cramping SE with atorvastatin use. If LDL < 130, no meds will monitor only. Pt prefers to take little meds  - Lipid Panel; Future    4. Essential (primary) hypertension  Not at goal, asked to resume amlodipine at LOWER dose than before. Pt agreed  - amLODIPine (NORVASC) 2.5 MG tablet; Take 1 tablet by mouth daily  Dispense: 30 tablet; Refill: 2    5. Iron deficiency anemia secondary to inadequate dietary iron intake  Not at goal, pt taking oral supplementation once daily, pending labs may need up to TID &/or referral to [facility] for iron infusion. Asked to see GI for plausible cause to anemia with melena. Discussed likely need for EGD.  - CBC; Future  - COLETTE Amaya MD, Gastroenterology, [place]  - Iron and TIBC; Future    6. Melena  Improved, but still needs work up. FOBT normal in hosptial.   - CBC; Future  - COLETTE Amaya MD, Gastroenterology, [place]    7. Need for hepatitis C screening test  - Hepatitis C Virus (HCV) RNA, Diagnosis; Future    8. Screening for HIV without presence of risk factors  - HIV 1/2 Ag/Ab, 4TH Generation,W Rflx Confirm; Future    9. Dizziness  Ongoing, possibly 2/2 anemia, but asked to see neuro with possible MRI imaging  - [facility] - Leda Rowe DO, [facility] ([address])    10.  Encounter for support and coordination of transition of

## 2023-06-20 NOTE — PROGRESS NOTES
Rita Gee is a 70 y.o. female  HIPAA verified by two patient identifiers. Health Maintenance Due   Topic Date Due    COVID-19 Vaccine (1) Never done    HIV screen  Never done    Hepatitis C screen  Never done    DTaP/Tdap/Td vaccine (1 - Tdap) Never done    Cervical cancer screen  Never done    Breast cancer screen  Never done    Shingles vaccine (1 of 2) Never done    DEXA (modify frequency per FRAX score)  Never done    A1C test (Diabetic or Prediabetic)  08/07/2021    Lipids  08/07/2021    Depression Screen  08/24/2022    Pneumococcal 65+ years Vaccine (1 - PCV) Never done     Chief Complaint   Patient presents with    Follow-Up from Hospital     BP (!) 142/71   Pulse 83   Temp 97.2 °F (36.2 °C) (Temporal)   Resp 18   Ht 5' 4\" (1.626 m)   Wt 138 lb (62.6 kg)   SpO2 98%   BMI 23.69 kg/m²     Pain Scale: 0 - No pain/10  Pain Location:   1. Have you been to the ER, urgent care clinic since your last visit? Hospitalized since your last visit? No    2. Have you seen or consulted any other health care providers outside of the [address] [place] [address] since your last visit? Include any pap smears or colon screening.  No

## 2023-06-20 NOTE — PATIENT INSTRUCTIONS
Aim to eat an IRON RICH DIET. Also taking over the counter ferrous sulfate (iron) tablets ONCE DAILY on an empty stomach with Vitamin C (like OJ) will help with your low hemoglobin (Anemia). The side effects of iron are primarily GI in type, such as cramping, constipation, black stools. Start with low dose of ferrous sulfate 325 mg once daily, and if tolerated, increase dose to 2-3 tabs daily.

## 2023-06-29 ENCOUNTER — TELEPHONE (OUTPATIENT)
Facility: CLINIC | Age: 63
End: 2023-06-29

## 2023-06-29 DIAGNOSIS — Z63.4 SPOUSE DECEASED: Primary | ICD-10-CM

## 2023-06-29 RX ORDER — LORAZEPAM 0.5 MG/1
0.5 TABLET ORAL EVERY 6 HOURS PRN
Qty: 4 TABLET | Refills: 0 | Status: SHIPPED | OUTPATIENT
Start: 2023-06-29 | End: 2023-07-04

## 2023-06-29 SDOH — SOCIAL STABILITY - SOCIAL INSECURITY: DISSAPEARANCE AND DEATH OF FAMILY MEMBER: Z63.4

## 2023-07-06 DIAGNOSIS — Z11.59 NEED FOR HEPATITIS C SCREENING TEST: ICD-10-CM

## 2023-07-06 DIAGNOSIS — E78.2 MIXED HYPERLIPIDEMIA: ICD-10-CM

## 2023-07-06 DIAGNOSIS — E83.51 HYPOCALCEMIA: ICD-10-CM

## 2023-07-06 DIAGNOSIS — D50.8 IRON DEFICIENCY ANEMIA SECONDARY TO INADEQUATE DIETARY IRON INTAKE: ICD-10-CM

## 2023-07-06 DIAGNOSIS — K92.1 MELENA: ICD-10-CM

## 2023-07-06 DIAGNOSIS — Z11.4 SCREENING FOR HIV WITHOUT PRESENCE OF RISK FACTORS: ICD-10-CM

## 2023-07-06 LAB
ANION GAP SERPL CALC-SCNC: 6 MMOL/L (ref 5–15)
BUN SERPL-MCNC: 11 MG/DL (ref 6–20)
BUN/CREAT SERPL: 13 (ref 12–20)
CALCIUM SERPL-MCNC: 8.9 MG/DL (ref 8.5–10.1)
CHLORIDE SERPL-SCNC: 107 MMOL/L (ref 97–108)
CHOLEST SERPL-MCNC: 226 MG/DL
CO2 SERPL-SCNC: 28 MMOL/L (ref 21–32)
CREAT SERPL-MCNC: 0.84 MG/DL (ref 0.55–1.02)
ERYTHROCYTE [DISTWIDTH] IN BLOOD BY AUTOMATED COUNT: 13.4 % (ref 11.5–14.5)
GLUCOSE SERPL-MCNC: 113 MG/DL (ref 65–100)
HCT VFR BLD AUTO: 39.5 % (ref 35–47)
HDLC SERPL-MCNC: 50 MG/DL
HDLC SERPL: 4.5 (ref 0–5)
HGB BLD-MCNC: 12.3 G/DL (ref 11.5–16)
HIV 1+2 AB+HIV1 P24 AG SERPL QL IA: NONREACTIVE
HIV 1/2 RESULT COMMENT: NORMAL
IRON SATN MFR SERPL: 47 % (ref 20–50)
IRON SERPL-MCNC: 161 UG/DL (ref 35–150)
LDLC SERPL CALC-MCNC: 130.2 MG/DL (ref 0–100)
MCH RBC QN AUTO: 29 PG (ref 26–34)
MCHC RBC AUTO-ENTMCNC: 31.1 G/DL (ref 30–36.5)
MCV RBC AUTO: 93.2 FL (ref 80–99)
NRBC # BLD: 0 K/UL (ref 0–0.01)
NRBC BLD-RTO: 0 PER 100 WBC
PLATELET # BLD AUTO: 308 K/UL (ref 150–400)
PMV BLD AUTO: 9.8 FL (ref 8.9–12.9)
POTASSIUM SERPL-SCNC: 3.6 MMOL/L (ref 3.5–5.1)
RBC # BLD AUTO: 4.24 M/UL (ref 3.8–5.2)
SODIUM SERPL-SCNC: 141 MMOL/L (ref 136–145)
TIBC SERPL-MCNC: 346 UG/DL (ref 250–450)
TRIGL SERPL-MCNC: 229 MG/DL
VLDLC SERPL CALC-MCNC: 45.8 MG/DL
WBC # BLD AUTO: 4.4 K/UL (ref 3.6–11)

## 2023-07-07 PROBLEM — R77.8 ELEVATED TROPONIN: Status: RESOLVED | Noted: 2023-06-07 | Resolved: 2023-07-07

## 2023-07-07 PROBLEM — R79.89 ELEVATED TROPONIN: Status: RESOLVED | Noted: 2023-06-07 | Resolved: 2023-07-07

## 2023-07-07 LAB
HCV RNA, QN (IU/ML): NORMAL IU/ML
TEST INFORMATION: NORMAL

## 2023-07-11 ENCOUNTER — TELEPHONE (OUTPATIENT)
Facility: CLINIC | Age: 63
End: 2023-07-11

## 2023-07-11 DIAGNOSIS — Z12.31 SCREENING MAMMOGRAM FOR BREAST CANCER: Primary | ICD-10-CM

## 2023-07-11 NOTE — TELEPHONE ENCOUNTER
Left voicemail for patient to call back to make mammogram appointment   Call Me back at [phone]  My name is Maricruz Houston  call Central Scheduling [phone]

## 2023-07-20 ENCOUNTER — OFFICE VISIT (OUTPATIENT)
Facility: CLINIC | Age: 63
End: 2023-07-20
Payer: COMMERCIAL

## 2023-07-20 VITALS
BODY MASS INDEX: 23.22 KG/M2 | OXYGEN SATURATION: 100 % | DIASTOLIC BLOOD PRESSURE: 60 MMHG | RESPIRATION RATE: 19 BRPM | HEART RATE: 69 BPM | WEIGHT: 136 LBS | HEIGHT: 64 IN | TEMPERATURE: 96.7 F | SYSTOLIC BLOOD PRESSURE: 133 MMHG

## 2023-07-20 DIAGNOSIS — F43.21 ADJUSTMENT DISORDER WITH DEPRESSED MOOD: ICD-10-CM

## 2023-07-20 DIAGNOSIS — G47.9 SLEEP DISTURBANCE: ICD-10-CM

## 2023-07-20 DIAGNOSIS — F43.21 GRIEF: ICD-10-CM

## 2023-07-20 DIAGNOSIS — I10 HTN (HYPERTENSION), BENIGN: Primary | ICD-10-CM

## 2023-07-20 DIAGNOSIS — R42 DIZZINESS: ICD-10-CM

## 2023-07-20 PROCEDURE — 3074F SYST BP LT 130 MM HG: CPT | Performed by: NURSE PRACTITIONER

## 2023-07-20 PROCEDURE — 1124F ACP DISCUSS-NO DSCNMKR DOCD: CPT | Performed by: NURSE PRACTITIONER

## 2023-07-20 PROCEDURE — 3078F DIAST BP <80 MM HG: CPT | Performed by: NURSE PRACTITIONER

## 2023-07-20 PROCEDURE — 99214 OFFICE O/P EST MOD 30 MIN: CPT | Performed by: NURSE PRACTITIONER

## 2023-07-20 ASSESSMENT — PATIENT HEALTH QUESTIONNAIRE - PHQ9
9. THOUGHTS THAT YOU WOULD BE BETTER OFF DEAD, OR OF HURTING YOURSELF: 0
SUM OF ALL RESPONSES TO PHQ9 QUESTIONS 1 & 2: 4
6. FEELING BAD ABOUT YOURSELF - OR THAT YOU ARE A FAILURE OR HAVE LET YOURSELF OR YOUR FAMILY DOWN: 3
4. FEELING TIRED OR HAVING LITTLE ENERGY: 3
2. FEELING DOWN, DEPRESSED OR HOPELESS: 3
7. TROUBLE CONCENTRATING ON THINGS, SUCH AS READING THE NEWSPAPER OR WATCHING TELEVISION: 3
1. LITTLE INTEREST OR PLEASURE IN DOING THINGS: 1
SUM OF ALL RESPONSES TO PHQ QUESTIONS 1-9: 20
10. IF YOU CHECKED OFF ANY PROBLEMS, HOW DIFFICULT HAVE THESE PROBLEMS MADE IT FOR YOU TO DO YOUR WORK, TAKE CARE OF THINGS AT HOME, OR GET ALONG WITH OTHER PEOPLE: 2
8. MOVING OR SPEAKING SO SLOWLY THAT OTHER PEOPLE COULD HAVE NOTICED. OR THE OPPOSITE, BEING SO FIGETY OR RESTLESS THAT YOU HAVE BEEN MOVING AROUND A LOT MORE THAN USUAL: 2
3. TROUBLE FALLING OR STAYING ASLEEP: 2
5. POOR APPETITE OR OVEREATING: 3
SUM OF ALL RESPONSES TO PHQ QUESTIONS 1-9: 20

## 2023-07-20 ASSESSMENT — COLUMBIA-SUICIDE SEVERITY RATING SCALE - C-SSRS
1. WITHIN THE PAST MONTH, HAVE YOU WISHED YOU WERE DEAD OR WISHED YOU COULD GO TO SLEEP AND NOT WAKE UP?: NO
2. HAVE YOU ACTUALLY HAD ANY THOUGHTS OF KILLING YOURSELF?: NO
6. HAVE YOU EVER DONE ANYTHING, STARTED TO DO ANYTHING, OR PREPARED TO DO ANYTHING TO END YOUR LIFE?: NO

## 2023-07-20 NOTE — PATIENT INSTRUCTIONS
Call your employer or HR department to see if you have an employee assistance program for counseling. Grief counseling in particular. If they do not have this, then I have also referred you to our counselor. You may call the office to schedule an appt to see [facility] [place].

## 2023-07-20 NOTE — PROGRESS NOTES
Pt is here for   Chief Complaint   Patient presents with    Follow-up     HTN, lab rev, spec     1. Have you been to the ER, urgent care clinic since your last visit? Hospitalized since your last visit? Yes When: 6/7/2023 for lightheadedness    2. Have you seen or consulted any other health care providers outside of the [address] [place] [address] Avenue since your last visit? Include any pap smears or colon screening.  No
moving around a lot more than usual 2 - -   Thoughts that you would be better off dead, or of hurting yourself in some way 0 - -   PHQ-2 Score 4 0 -   Total Score PHQ 2 - - 2   PHQ-9 Total Score 20 0 -   If you checked off any problems, how difficult have these problems made it for you to do your work, take care of things at home, or get along with other people? 2 - -         Review of Systems  Constitutional: negative for fevers, chills, anorexia and weight loss  Respiratory:  negative for cough, hemoptysis, dyspnea, and wheezing  CV:   negative for chest pain, palpitations, and lower extremity edema  GI:   negative for nausea, vomiting, diarrhea, abdominal pain, and melena  Endo:               negative for polyuria,polydipsia,polyphagia, and heat intolerance  Genitourinary: negative for frequency, urgency, dysuria, retention, and hematuria  Integument:  negative for rash, ulcerations, and pruritus  Hematologic:  negative for easy bruising and bleeding  Musculoskel: negative for arthralgias, muscle weakness,and joint pain/swelling  Neurological:  negative for headaches, dizziness, vertigo,and memory/gait problems  Behavl/Psych: negative for feelings of anxiety, depression, suicide, and mood changes    Past Medical History:   Diagnosis Date    Hypertension        No past surgical history on file.     Current Outpatient Medications   Medication Sig    magnesium 30 MG tablet Take 1 tablet by mouth 2 times daily    amLODIPine (NORVASC) 2.5 MG tablet Take 1 tablet by mouth daily    atorvastatin (LIPITOR) 20 MG tablet Take 1 tablet by mouth daily    Multiple Vitamins-Minerals (CENTRUM ADULTS PO) Take 1 tablet by mouth every morning    SAMBUCOL BLACK ELDERBERRY PO Take 1 tablet by mouth daily    Omega-3 1000 MG CAPS Take 1 capsule by mouth daily    Ferrous Sulfate (IRON) 142 (45 Fe) MG TBCR Take 1 tablet by mouth daily    Trolamine Salicylate (ASPERCREME ORIGINAL EX) Apply topically as needed (pain in knees and shoulder)

## 2023-08-08 ENCOUNTER — OFFICE VISIT (OUTPATIENT)
Facility: CLINIC | Age: 63
End: 2023-08-08
Payer: COMMERCIAL

## 2023-08-08 VITALS
RESPIRATION RATE: 17 BRPM | OXYGEN SATURATION: 96 % | SYSTOLIC BLOOD PRESSURE: 132 MMHG | DIASTOLIC BLOOD PRESSURE: 66 MMHG | WEIGHT: 134 LBS | HEIGHT: 64 IN | HEART RATE: 95 BPM | TEMPERATURE: 98.4 F | BODY MASS INDEX: 22.88 KG/M2

## 2023-08-08 DIAGNOSIS — F51.02 ADJUSTMENT INSOMNIA: ICD-10-CM

## 2023-08-08 DIAGNOSIS — F45.41 STRESS HEADACHE: ICD-10-CM

## 2023-08-08 DIAGNOSIS — F43.21 ADJUSTMENT DISORDER WITH DEPRESSED MOOD: Primary | ICD-10-CM

## 2023-08-08 DIAGNOSIS — G47.9 SLEEP DISTURBANCE: ICD-10-CM

## 2023-08-08 DIAGNOSIS — I10 ESSENTIAL (PRIMARY) HYPERTENSION: ICD-10-CM

## 2023-08-08 PROCEDURE — 1124F ACP DISCUSS-NO DSCNMKR DOCD: CPT | Performed by: NURSE PRACTITIONER

## 2023-08-08 PROCEDURE — 3078F DIAST BP <80 MM HG: CPT | Performed by: NURSE PRACTITIONER

## 2023-08-08 PROCEDURE — 99214 OFFICE O/P EST MOD 30 MIN: CPT | Performed by: NURSE PRACTITIONER

## 2023-08-08 PROCEDURE — 3075F SYST BP GE 130 - 139MM HG: CPT | Performed by: NURSE PRACTITIONER

## 2023-08-08 RX ORDER — TRAZODONE HYDROCHLORIDE 50 MG/1
50 TABLET ORAL NIGHTLY
Qty: 90 TABLET | Refills: 1 | Status: SHIPPED | OUTPATIENT
Start: 2023-08-08

## 2023-08-08 RX ORDER — AMLODIPINE BESYLATE 2.5 MG/1
2.5 TABLET ORAL DAILY
Qty: 30 TABLET | Refills: 2 | Status: CANCELLED | OUTPATIENT
Start: 2023-08-08

## 2023-08-08 NOTE — PROGRESS NOTES
Pt is here for   Chief Complaint   Patient presents with    Follow-up     Adjustment disorder/grief    Headache     1. Have you been to the ER, urgent care clinic since your last visit? Hospitalized since your last visit? No    2. Have you seen or consulted any other health care providers outside of the [address] [place] [address] since your last visit? Include any pap smears or colon screening.  No

## 2023-08-08 NOTE — PROGRESS NOTES
Wale Colon 1960 is a 70 y.o. female, Established patient, here for evaluation of the following chief complaint(s):  Follow-up (Adjustment disorder/grief) and Headache      ASSESSMENT/PLAN:  Below is the assessment and plan developed based on review of pertinent history, physical exam, labs, studies, and medications. 1. Essential (primary) hypertension  Stable, continue use of amlodipine. 2. Adjustment disorder with depressed mood  Not at goal, trial of trazodone  - Amb External Referral To Psychiatry  - traZODone (DESYREL) 50 MG tablet; Take 1 tablet by mouth nightly  Dispense: 90 tablet; Refill: 1    3. Adjustment insomnia  Not at goal, likely cause of reported headaches, trial of trazodone. - traZODone (DESYREL) 50 MG tablet; Take 1 tablet by mouth nightly  Dispense: 90 tablet; Refill: 1    4. Sleep disturbance  See #2    5. Stress headache  New, likely due to impaired sleep. Treating indirectly for now. Pt prefers less meds overall. Pt asked to complete follow by next visit: call if any problems, continue taking meds as prescribed. SUBJECTIVE/OBJECTIVE:  HPI    Pt presents to f/u grief, adjustment disorder depressed. Taking amlodipine only. Nervous to start statin, despite recent TIA like experience. Recalls leg cramping when med taken in the past. Denies side effects from medication. Feels the same, did NOT have therapy appt. Continues with disrupted sleep, often awaking in the middle of the morning, unable to return to sleep. Tried sleepytime tea and melatonin with sleep onset, but not maintenance. Prefers to see someone in person. Also still needs [facility] paperwork completed, none rec'd here yet however.        Review of Systems  Constitutional: negative for fevers, chills, anorexia and weight loss  Respiratory:  negative for cough, hemoptysis, dyspnea, and wheezing  CV:   negative for chest pain, palpitations, and lower extremity edema  GI:   negative for nausea, vomiting,

## 2023-09-05 ENCOUNTER — OFFICE VISIT (OUTPATIENT)
Facility: CLINIC | Age: 63
End: 2023-09-05
Payer: COMMERCIAL

## 2023-09-05 VITALS
DIASTOLIC BLOOD PRESSURE: 75 MMHG | OXYGEN SATURATION: 96 % | HEART RATE: 92 BPM | SYSTOLIC BLOOD PRESSURE: 133 MMHG | TEMPERATURE: 97.5 F | WEIGHT: 135 LBS | RESPIRATION RATE: 16 BRPM | HEIGHT: 64 IN | BODY MASS INDEX: 23.05 KG/M2

## 2023-09-05 DIAGNOSIS — I10 ESSENTIAL (PRIMARY) HYPERTENSION: Primary | ICD-10-CM

## 2023-09-05 DIAGNOSIS — F43.21 ADJUSTMENT DISORDER WITH DEPRESSED MOOD: ICD-10-CM

## 2023-09-05 DIAGNOSIS — L23.7 POISON IVY DERMATITIS: ICD-10-CM

## 2023-09-05 DIAGNOSIS — F43.21 GRIEF: ICD-10-CM

## 2023-09-05 DIAGNOSIS — F51.02 ADJUSTMENT INSOMNIA: ICD-10-CM

## 2023-09-05 PROCEDURE — 1124F ACP DISCUSS-NO DSCNMKR DOCD: CPT | Performed by: NURSE PRACTITIONER

## 2023-09-05 PROCEDURE — 3075F SYST BP GE 130 - 139MM HG: CPT | Performed by: NURSE PRACTITIONER

## 2023-09-05 PROCEDURE — 3078F DIAST BP <80 MM HG: CPT | Performed by: NURSE PRACTITIONER

## 2023-09-05 PROCEDURE — 99214 OFFICE O/P EST MOD 30 MIN: CPT | Performed by: NURSE PRACTITIONER

## 2023-09-05 RX ORDER — PREDNISONE 20 MG/1
20 TABLET ORAL DAILY
Qty: 5 TABLET | Refills: 0 | Status: SHIPPED | OUTPATIENT
Start: 2023-09-05 | End: 2023-09-10

## 2023-09-05 RX ORDER — AMLODIPINE BESYLATE 2.5 MG/1
2.5 TABLET ORAL DAILY
Qty: 90 TABLET | Refills: 3 | Status: SHIPPED | OUTPATIENT
Start: 2023-09-05

## 2023-09-05 ASSESSMENT — PATIENT HEALTH QUESTIONNAIRE - PHQ9
SUM OF ALL RESPONSES TO PHQ QUESTIONS 1-9: 5
10. IF YOU CHECKED OFF ANY PROBLEMS, HOW DIFFICULT HAVE THESE PROBLEMS MADE IT FOR YOU TO DO YOUR WORK, TAKE CARE OF THINGS AT HOME, OR GET ALONG WITH OTHER PEOPLE: 1
5. POOR APPETITE OR OVEREATING: 0
SUM OF ALL RESPONSES TO PHQ QUESTIONS 1-9: 5
8. MOVING OR SPEAKING SO SLOWLY THAT OTHER PEOPLE COULD HAVE NOTICED. OR THE OPPOSITE, BEING SO FIGETY OR RESTLESS THAT YOU HAVE BEEN MOVING AROUND A LOT MORE THAN USUAL: 2
2. FEELING DOWN, DEPRESSED OR HOPELESS: 1
9. THOUGHTS THAT YOU WOULD BE BETTER OFF DEAD, OR OF HURTING YOURSELF: 0
SUM OF ALL RESPONSES TO PHQ9 QUESTIONS 1 & 2: 1
7. TROUBLE CONCENTRATING ON THINGS, SUCH AS READING THE NEWSPAPER OR WATCHING TELEVISION: 0
SUM OF ALL RESPONSES TO PHQ QUESTIONS 1-9: 5
1. LITTLE INTEREST OR PLEASURE IN DOING THINGS: 0
4. FEELING TIRED OR HAVING LITTLE ENERGY: 1
6. FEELING BAD ABOUT YOURSELF - OR THAT YOU ARE A FAILURE OR HAVE LET YOURSELF OR YOUR FAMILY DOWN: 1
3. TROUBLE FALLING OR STAYING ASLEEP: 0
SUM OF ALL RESPONSES TO PHQ QUESTIONS 1-9: 5

## 2023-10-06 ENCOUNTER — TELEPHONE (OUTPATIENT)
Facility: CLINIC | Age: 63
End: 2023-10-06

## 2023-10-11 ENCOUNTER — TELEMEDICINE (OUTPATIENT)
Facility: CLINIC | Age: 63
End: 2023-10-11
Payer: COMMERCIAL

## 2023-10-11 DIAGNOSIS — M54.42 ACUTE LEFT-SIDED LOW BACK PAIN WITH LEFT-SIDED SCIATICA: Primary | ICD-10-CM

## 2023-10-11 DIAGNOSIS — F43.23 ADJUSTMENT DISORDER WITH MIXED ANXIETY AND DEPRESSED MOOD: ICD-10-CM

## 2023-10-11 DIAGNOSIS — F51.02 ADJUSTMENT INSOMNIA: ICD-10-CM

## 2023-10-11 PROCEDURE — 1124F ACP DISCUSS-NO DSCNMKR DOCD: CPT | Performed by: NURSE PRACTITIONER

## 2023-10-11 PROCEDURE — 99214 OFFICE O/P EST MOD 30 MIN: CPT | Performed by: NURSE PRACTITIONER

## 2023-10-11 RX ORDER — TRAZODONE HYDROCHLORIDE 50 MG/1
50 TABLET ORAL NIGHTLY
Qty: 90 TABLET | Refills: 1 | Status: CANCELLED | OUTPATIENT
Start: 2023-10-11

## 2023-10-11 RX ORDER — PREDNISONE 10 MG/1
TABLET ORAL
Qty: 21 EACH | Refills: 0 | Status: SHIPPED | OUTPATIENT
Start: 2023-10-11

## 2023-10-11 RX ORDER — METHOCARBAMOL 500 MG/1
500 TABLET, FILM COATED ORAL 4 TIMES DAILY
Qty: 40 TABLET | Refills: 0 | Status: SHIPPED | OUTPATIENT
Start: 2023-10-11 | End: 2023-10-21

## 2023-10-11 SDOH — ECONOMIC STABILITY: INCOME INSECURITY: HOW HARD IS IT FOR YOU TO PAY FOR THE VERY BASICS LIKE FOOD, HOUSING, MEDICAL CARE, AND HEATING?: NOT HARD AT ALL

## 2023-10-11 SDOH — ECONOMIC STABILITY: FOOD INSECURITY: WITHIN THE PAST 12 MONTHS, YOU WORRIED THAT YOUR FOOD WOULD RUN OUT BEFORE YOU GOT MONEY TO BUY MORE.: NEVER TRUE

## 2023-10-11 SDOH — ECONOMIC STABILITY: FOOD INSECURITY: WITHIN THE PAST 12 MONTHS, THE FOOD YOU BOUGHT JUST DIDN'T LAST AND YOU DIDN'T HAVE MONEY TO GET MORE.: NEVER TRUE

## 2023-10-11 ASSESSMENT — PATIENT HEALTH QUESTIONNAIRE - PHQ9
9. THOUGHTS THAT YOU WOULD BE BETTER OFF DEAD, OR OF HURTING YOURSELF: 0
SUM OF ALL RESPONSES TO PHQ QUESTIONS 1-9: 2
SUM OF ALL RESPONSES TO PHQ9 QUESTIONS 1 & 2: 4
SUM OF ALL RESPONSES TO PHQ QUESTIONS 1-9: 10
4. FEELING TIRED OR HAVING LITTLE ENERGY: 1
7. TROUBLE CONCENTRATING ON THINGS, SUCH AS READING THE NEWSPAPER OR WATCHING TELEVISION: 2
10. IF YOU CHECKED OFF ANY PROBLEMS, HOW DIFFICULT HAVE THESE PROBLEMS MADE IT FOR YOU TO DO YOUR WORK, TAKE CARE OF THINGS AT HOME, OR GET ALONG WITH OTHER PEOPLE: 1
SUM OF ALL RESPONSES TO PHQ QUESTIONS 1-9: 2
6. FEELING BAD ABOUT YOURSELF - OR THAT YOU ARE A FAILURE OR HAVE LET YOURSELF OR YOUR FAMILY DOWN: 0
SUM OF ALL RESPONSES TO PHQ QUESTIONS 1-9: 2
SUM OF ALL RESPONSES TO PHQ QUESTIONS 1-9: 10
5. POOR APPETITE OR OVEREATING: 0
SUM OF ALL RESPONSES TO PHQ QUESTIONS 1-9: 10
SUM OF ALL RESPONSES TO PHQ QUESTIONS 1-9: 10
8. MOVING OR SPEAKING SO SLOWLY THAT OTHER PEOPLE COULD HAVE NOTICED. OR THE OPPOSITE, BEING SO FIGETY OR RESTLESS THAT YOU HAVE BEEN MOVING AROUND A LOT MORE THAN USUAL: 1
1. LITTLE INTEREST OR PLEASURE IN DOING THINGS: 2
3. TROUBLE FALLING OR STAYING ASLEEP: 2
1. LITTLE INTEREST OR PLEASURE IN DOING THINGS: 1
SUM OF ALL RESPONSES TO PHQ QUESTIONS 1-9: 2
SUM OF ALL RESPONSES TO PHQ9 QUESTIONS 1 & 2: 2
2. FEELING DOWN, DEPRESSED OR HOPELESS: 2
2. FEELING DOWN, DEPRESSED OR HOPELESS: 1

## 2023-10-11 ASSESSMENT — ANXIETY QUESTIONNAIRES
IF YOU CHECKED OFF ANY PROBLEMS ON THIS QUESTIONNAIRE, HOW DIFFICULT HAVE THESE PROBLEMS MADE IT FOR YOU TO DO YOUR WORK, TAKE CARE OF THINGS AT HOME, OR GET ALONG WITH OTHER PEOPLE: NOT DIFFICULT AT ALL
6. BECOMING EASILY ANNOYED OR IRRITABLE: 2
1. FEELING NERVOUS, ANXIOUS, OR ON EDGE: 2
5. BEING SO RESTLESS THAT IT IS HARD TO SIT STILL: 0
4. TROUBLE RELAXING: 1
GAD7 TOTAL SCORE: 5
3. WORRYING TOO MUCH ABOUT DIFFERENT THINGS: 0
7. FEELING AFRAID AS IF SOMETHING AWFUL MIGHT HAPPEN: 0
2. NOT BEING ABLE TO STOP OR CONTROL WORRYING: 0

## 2023-10-11 NOTE — PROGRESS NOTES
Kelli Bryson is a 70 y.o. female Established patient, here for evaluation of the following chief complaint(s):   Leg Pain (7/10 left leg)          Assessment & Plan:   Below is the assessment and plan developed based on review of pertinent history, physical exam, labs, studies, and medications. 1. Acute left-sided low back pain with left-sided sciatica  New, steroid dose pack with instructions to 2 [place] PM while taking. Approved to take APAP and m. Relaxer if needed. Encouraged to look up sciatica exercises and avoiding clustering so many tasks like this in the future. - predniSONE 10 MG (21) TBPK; Take 6 pills one day 1, 5 pills on day 2, 4 pills on day 3, 3 pills on day 4, 2 pills on day 5, and 1 pill on day 6. May spread pills out throughout the day with meals or take all at once. Be sure to take WITH FOOD. Dispense: 21 each; Refill: 0  - methocarbamol (ROBAXIN) 500 MG tablet; Take 1 tablet by mouth 4 times daily for 10 days  Dispense: 40 tablet; Refill: 0    2. Adjustment disorder with mixed anxiety and depressed mood  Worse, DEON and PHQ 9 scores exacerbated off med. Advised to call pharmacy for refill ast 6 month supply was ordered. Deferred refill of ativan, not needed today. If anxiety persist after med restart, may consider hydroxyzine. 3. Adjustment insomnia  Worse, see #2. Resume trazodone nightly use. Very effective before. *extending RTW to 11/27/23 to allow for med mgt, re-evaluation, and holiday passing since will be first without . Follow-up and Dispositions    Return for Keep appt as scheduled. Specific pt instructions until next visit: call if any problems    Subjective:   Kelli Bryson is a 70 y.o. female who was seen for Leg Pain (7/10 left leg)      Pt presents with left knee and leg pain. Started 1-2 weeks ago. Associated with heavy lifting (moving lawnmower, doing yardwork, and lifting boxes), back pain, and worsened by walking.  Tried Aleve PM. Has

## 2023-10-31 ENCOUNTER — OFFICE VISIT (OUTPATIENT)
Facility: CLINIC | Age: 63
End: 2023-10-31
Payer: COMMERCIAL

## 2023-10-31 VITALS
HEIGHT: 64 IN | TEMPERATURE: 97.7 F | BODY MASS INDEX: 22.88 KG/M2 | DIASTOLIC BLOOD PRESSURE: 55 MMHG | HEART RATE: 67 BPM | OXYGEN SATURATION: 99 % | RESPIRATION RATE: 16 BRPM | WEIGHT: 134 LBS | SYSTOLIC BLOOD PRESSURE: 121 MMHG

## 2023-10-31 DIAGNOSIS — M54.42 CHRONIC MIDLINE LOW BACK PAIN WITH LEFT-SIDED SCIATICA: Primary | ICD-10-CM

## 2023-10-31 DIAGNOSIS — G89.29 CHRONIC MIDLINE LOW BACK PAIN WITH LEFT-SIDED SCIATICA: Primary | ICD-10-CM

## 2023-10-31 DIAGNOSIS — M79.10 MYALGIA DUE TO STATIN: ICD-10-CM

## 2023-10-31 DIAGNOSIS — D50.8 IRON DEFICIENCY ANEMIA SECONDARY TO INADEQUATE DIETARY IRON INTAKE: ICD-10-CM

## 2023-10-31 DIAGNOSIS — E78.2 MIXED HYPERLIPIDEMIA: ICD-10-CM

## 2023-10-31 DIAGNOSIS — R25.2 LEG CRAMPING: ICD-10-CM

## 2023-10-31 DIAGNOSIS — F43.21 ADJUSTMENT DISORDER WITH DEPRESSED MOOD: ICD-10-CM

## 2023-10-31 DIAGNOSIS — T46.6X5A MYALGIA DUE TO STATIN: ICD-10-CM

## 2023-10-31 DIAGNOSIS — I10 ESSENTIAL (PRIMARY) HYPERTENSION: ICD-10-CM

## 2023-10-31 DIAGNOSIS — F43.21 GRIEF: ICD-10-CM

## 2023-10-31 PROCEDURE — 1124F ACP DISCUSS-NO DSCNMKR DOCD: CPT | Performed by: NURSE PRACTITIONER

## 2023-10-31 PROCEDURE — 99214 OFFICE O/P EST MOD 30 MIN: CPT | Performed by: NURSE PRACTITIONER

## 2023-10-31 PROCEDURE — 3074F SYST BP LT 130 MM HG: CPT | Performed by: NURSE PRACTITIONER

## 2023-10-31 PROCEDURE — 3078F DIAST BP <80 MM HG: CPT | Performed by: NURSE PRACTITIONER

## 2023-10-31 RX ORDER — UBIDECARENONE 200 MG
200 CAPSULE ORAL DAILY
Qty: 90 CAPSULE | Refills: 3 | Status: SHIPPED | OUTPATIENT
Start: 2023-10-31

## 2023-10-31 ASSESSMENT — PATIENT HEALTH QUESTIONNAIRE - PHQ9
2. FEELING DOWN, DEPRESSED OR HOPELESS: 2
7. TROUBLE CONCENTRATING ON THINGS, SUCH AS READING THE NEWSPAPER OR WATCHING TELEVISION: 1
9. THOUGHTS THAT YOU WOULD BE BETTER OFF DEAD, OR OF HURTING YOURSELF: 0
5. POOR APPETITE OR OVEREATING: 0
6. FEELING BAD ABOUT YOURSELF - OR THAT YOU ARE A FAILURE OR HAVE LET YOURSELF OR YOUR FAMILY DOWN: 0
1. LITTLE INTEREST OR PLEASURE IN DOING THINGS: 0
SUM OF ALL RESPONSES TO PHQ QUESTIONS 1-9: 7
4. FEELING TIRED OR HAVING LITTLE ENERGY: 2
SUM OF ALL RESPONSES TO PHQ QUESTIONS 1-9: 7
SUM OF ALL RESPONSES TO PHQ9 QUESTIONS 1 & 2: 2
SUM OF ALL RESPONSES TO PHQ QUESTIONS 1-9: 7
3. TROUBLE FALLING OR STAYING ASLEEP: 2
SUM OF ALL RESPONSES TO PHQ QUESTIONS 1-9: 7
10. IF YOU CHECKED OFF ANY PROBLEMS, HOW DIFFICULT HAVE THESE PROBLEMS MADE IT FOR YOU TO DO YOUR WORK, TAKE CARE OF THINGS AT HOME, OR GET ALONG WITH OTHER PEOPLE: 1

## 2023-10-31 NOTE — PATIENT INSTRUCTIONS
Try to use compression stockings at night to help with the leg cramping, available in medical supply and drug stores, to reduce swelling. Wear when you are standing or on your feet for long periods of time OR overnight if you are NOT active for the day. Also try HOLDING your atorvastatin for 2 week and take the COENZYME Q 10 with your other meds instead. Then restart the atorvastatin WITH the coenzyme Q 10 to see if this helps. Be sure to stay hydrated, aiming to drink at least 8 glasses or 4 BOTTLES of water daily.

## 2023-10-31 NOTE — PROGRESS NOTES
Angelic Lorenz 1960 is a 70 y.o. female, Established patient, here for evaluation of the following chief complaint(s):  Follow-up      ASSESSMENT/PLAN:  Below is the assessment and plan developed based on review of pertinent history, physical exam, labs, studies, and medications. 1. Chronic midline low back pain with left-sided sciatica  Not improved, having numbness and pain radiation down left leg. Imaging ordered. - CT LUMBAR SPINE WO CONTRAST; Future    2. Myalgia due to statin  New, advised to [address] and use coenzyme Q10 x 2 wks then resume.   - Coenzyme Q-10 200 MG CAPS; Take 200 mg by mouth daily Take ALONE x 2 weeks, then resume WITH statin (Crestor, Zocor, or Lipitor). Dispense: 90 capsule; Refill: 3    3. Iron deficiency anemia secondary to inadequate dietary iron intake  Stable, continue supplementation. If labs are normal may hold if no longer wishes to take. - CBC; Future    4. Essential (primary) hypertension  Stable, continue amlodipine use. 5. Leg cramping  New, possibly statin induced. Increased water intake and use of compression stockings with stretching also advised. INI and labs are normal, may hold statin to see if this resolves symptoms. - Vitamin D 25 Hydroxy; Future  - Magnesium; Future  - Comprehensive Metabolic Panel; Future    6. Mixed hyperlipidemia  Stable, monitor. Hold statin for now, added CoQ10.   - Lipid Panel; Future          Follow-up and Dispositions    Return in 22 days (on 11/22/2023) for VV 4 wk- DEON/Grief, RTW fu, lab rev. Pt asked to complete follow by next visit: Call if any problems    SUBJECTIVE/OBJECTIVE:  HPI    Pt presents to f/u adjustment insomnia, depressed mood, grief, and HTN. Feels unable to return to work, especially not now during peak season with extended hours. Last note extended time out until 11/27. Still taking TRAZODONE nightly and other meds. Denies side effects from medication. Feels a little better.  Has stopped

## 2023-11-03 DIAGNOSIS — R25.2 LEG CRAMPING: ICD-10-CM

## 2023-11-03 DIAGNOSIS — E78.2 MIXED HYPERLIPIDEMIA: ICD-10-CM

## 2023-11-03 DIAGNOSIS — D50.8 IRON DEFICIENCY ANEMIA SECONDARY TO INADEQUATE DIETARY IRON INTAKE: ICD-10-CM

## 2023-11-04 LAB
25(OH)D3 SERPL-MCNC: 33.6 NG/ML (ref 30–100)
ALBUMIN SERPL-MCNC: 4.1 G/DL (ref 3.5–5)
ALBUMIN/GLOB SERPL: 1.1 (ref 1.1–2.2)
ALP SERPL-CCNC: 52 U/L (ref 45–117)
ALT SERPL-CCNC: 33 U/L (ref 12–78)
ANION GAP SERPL CALC-SCNC: 8 MMOL/L (ref 5–15)
AST SERPL-CCNC: 20 U/L (ref 15–37)
BILIRUB SERPL-MCNC: 0.6 MG/DL (ref 0.2–1)
BUN SERPL-MCNC: 13 MG/DL (ref 6–20)
BUN/CREAT SERPL: 15 (ref 12–20)
CALCIUM SERPL-MCNC: 8.8 MG/DL (ref 8.5–10.1)
CHLORIDE SERPL-SCNC: 107 MMOL/L (ref 97–108)
CHOLEST SERPL-MCNC: 212 MG/DL
CO2 SERPL-SCNC: 27 MMOL/L (ref 21–32)
CREAT SERPL-MCNC: 0.86 MG/DL (ref 0.55–1.02)
ERYTHROCYTE [DISTWIDTH] IN BLOOD BY AUTOMATED COUNT: 13.9 % (ref 11.5–14.5)
GLOBULIN SER CALC-MCNC: 3.9 G/DL (ref 2–4)
GLUCOSE SERPL-MCNC: 105 MG/DL (ref 65–100)
HCT VFR BLD AUTO: 43.2 % (ref 35–47)
HDLC SERPL-MCNC: 55 MG/DL
HDLC SERPL: 3.9 (ref 0–5)
HGB BLD-MCNC: 14 G/DL (ref 11.5–16)
LDLC SERPL CALC-MCNC: 115.6 MG/DL (ref 0–100)
MAGNESIUM SERPL-MCNC: 2.5 MG/DL (ref 1.6–2.4)
MCH RBC QN AUTO: 29.4 PG (ref 26–34)
MCHC RBC AUTO-ENTMCNC: 32.4 G/DL (ref 30–36.5)
MCV RBC AUTO: 90.8 FL (ref 80–99)
NRBC # BLD: 0 K/UL (ref 0–0.01)
NRBC BLD-RTO: 0 PER 100 WBC
PLATELET # BLD AUTO: 252 K/UL (ref 150–400)
PMV BLD AUTO: 10.1 FL (ref 8.9–12.9)
POTASSIUM SERPL-SCNC: 4 MMOL/L (ref 3.5–5.1)
PROT SERPL-MCNC: 8 G/DL (ref 6.4–8.2)
RBC # BLD AUTO: 4.76 M/UL (ref 3.8–5.2)
SODIUM SERPL-SCNC: 142 MMOL/L (ref 136–145)
TRIGL SERPL-MCNC: 207 MG/DL
VLDLC SERPL CALC-MCNC: 41.4 MG/DL
WBC # BLD AUTO: 6 K/UL (ref 3.6–11)

## 2023-11-10 NOTE — RESULT ENCOUNTER NOTE
Overall your labs look good, but...    Your cholesterol remains elevated. I would prefer you continue taking the statin, it may be the cause of your cramping.    You have plenty magnesium, if you are taking a supplement, you can HOLD it one day weekly also. Your level is slightly elevated.

## 2023-11-22 ENCOUNTER — TELEMEDICINE (OUTPATIENT)
Facility: CLINIC | Age: 63
End: 2023-11-22
Payer: COMMERCIAL

## 2023-11-22 DIAGNOSIS — F43.21 ADJUSTMENT DISORDER WITH DEPRESSED MOOD: Primary | ICD-10-CM

## 2023-11-22 DIAGNOSIS — M79.10 MYALGIA DUE TO STATIN: ICD-10-CM

## 2023-11-22 DIAGNOSIS — F43.21 GRIEF: ICD-10-CM

## 2023-11-22 DIAGNOSIS — F51.02 ADJUSTMENT INSOMNIA: ICD-10-CM

## 2023-11-22 DIAGNOSIS — T46.6X5A MYALGIA DUE TO STATIN: ICD-10-CM

## 2023-11-22 DIAGNOSIS — E78.2 MIXED HYPERLIPIDEMIA: ICD-10-CM

## 2023-11-22 PROCEDURE — 99214 OFFICE O/P EST MOD 30 MIN: CPT | Performed by: NURSE PRACTITIONER

## 2023-11-22 PROCEDURE — 1124F ACP DISCUSS-NO DSCNMKR DOCD: CPT | Performed by: NURSE PRACTITIONER

## 2023-11-22 RX ORDER — TRAZODONE HYDROCHLORIDE 100 MG/1
100 TABLET ORAL NIGHTLY
Qty: 90 TABLET | Refills: 1 | Status: SHIPPED | OUTPATIENT
Start: 2023-11-22

## 2023-11-22 ASSESSMENT — ANXIETY QUESTIONNAIRES
6. BECOMING EASILY ANNOYED OR IRRITABLE: 1
1. FEELING NERVOUS, ANXIOUS, OR ON EDGE: 1
IF YOU CHECKED OFF ANY PROBLEMS ON THIS QUESTIONNAIRE, HOW DIFFICULT HAVE THESE PROBLEMS MADE IT FOR YOU TO DO YOUR WORK, TAKE CARE OF THINGS AT HOME, OR GET ALONG WITH OTHER PEOPLE: VERY DIFFICULT
3. WORRYING TOO MUCH ABOUT DIFFERENT THINGS: 3
5. BEING SO RESTLESS THAT IT IS HARD TO SIT STILL: 1
GAD7 TOTAL SCORE: 10
2. NOT BEING ABLE TO STOP OR CONTROL WORRYING: 2
4. TROUBLE RELAXING: 1
7. FEELING AFRAID AS IF SOMETHING AWFUL MIGHT HAPPEN: 1

## 2023-11-22 ASSESSMENT — COLUMBIA-SUICIDE SEVERITY RATING SCALE - C-SSRS
3. HAVE YOU BEEN THINKING ABOUT HOW YOU MIGHT KILL YOURSELF?: NO
7. DID THIS OCCUR IN THE LAST THREE MONTHS: NO
2. HAVE YOU ACTUALLY HAD ANY THOUGHTS OF KILLING YOURSELF?: NO
1. WITHIN THE PAST MONTH, HAVE YOU WISHED YOU WERE DEAD OR WISHED YOU COULD GO TO SLEEP AND NOT WAKE UP?: YES
5. HAVE YOU STARTED TO WORK OUT OR WORKED OUT THE DETAILS OF HOW TO KILL YOURSELF? DO YOU INTEND TO CARRY OUT THIS PLAN?: NO
6. HAVE YOU EVER DONE ANYTHING, STARTED TO DO ANYTHING, OR PREPARED TO DO ANYTHING TO END YOUR LIFE?: NO
4. HAVE YOU HAD THESE THOUGHTS AND HAD SOME INTENTION OF ACTING ON THEM?: NO
BASED ON RESPONSES TO C-SSRS QS 1-6, WHAT IS THE PATIENT'S OVERALL RISK RATING FOR SUICIDE: LOW RISK

## 2023-11-22 ASSESSMENT — PATIENT HEALTH QUESTIONNAIRE - PHQ9
2. FEELING DOWN, DEPRESSED OR HOPELESS: 2
9. THOUGHTS THAT YOU WOULD BE BETTER OFF DEAD, OR OF HURTING YOURSELF: 1
5. POOR APPETITE OR OVEREATING: 1
7. TROUBLE CONCENTRATING ON THINGS, SUCH AS READING THE NEWSPAPER OR WATCHING TELEVISION: 1
6. FEELING BAD ABOUT YOURSELF - OR THAT YOU ARE A FAILURE OR HAVE LET YOURSELF OR YOUR FAMILY DOWN: 1
3. TROUBLE FALLING OR STAYING ASLEEP: 1
SUM OF ALL RESPONSES TO PHQ QUESTIONS 1-9: 11
8. MOVING OR SPEAKING SO SLOWLY THAT OTHER PEOPLE COULD HAVE NOTICED. OR THE OPPOSITE, BEING SO FIGETY OR RESTLESS THAT YOU HAVE BEEN MOVING AROUND A LOT MORE THAN USUAL: 1
SUM OF ALL RESPONSES TO PHQ9 QUESTIONS 1 & 2: 3
4. FEELING TIRED OR HAVING LITTLE ENERGY: 3
SUM OF ALL RESPONSES TO PHQ QUESTIONS 1-9: 12
10. IF YOU CHECKED OFF ANY PROBLEMS, HOW DIFFICULT HAVE THESE PROBLEMS MADE IT FOR YOU TO DO YOUR WORK, TAKE CARE OF THINGS AT HOME, OR GET ALONG WITH OTHER PEOPLE: 2
1. LITTLE INTEREST OR PLEASURE IN DOING THINGS: 1
SUM OF ALL RESPONSES TO PHQ QUESTIONS 1-9: 12
SUM OF ALL RESPONSES TO PHQ QUESTIONS 1-9: 12

## 2023-11-22 NOTE — PROGRESS NOTES
Sunil Ruiz is a 70 y.o. female Established patient, here for evaluation of the following chief complaint(s):   Discuss Labs          Assessment & Plan:   Below is the assessment and plan developed based on review of pertinent history, physical exam, labs, studies, and medications. 1. Adjustment disorder with depressed mood  Worse, likely due to upcoming family holiday and recently turning dog over. Extended time out from work to 1/12/24 as requested and INCREASED Trazodone to 100 mg. Advised to see therapist again,likely biweekly during holiday season to maximize benefit of med. - traZODone (DESYREL) 100 MG tablet; Take 1 tablet by mouth nightly  Dispense: 90 tablet; Refill: 1    2. Adjustment insomnia  Worse, increased dose of trazodone. - traZODone (DESYREL) 100 MG tablet; Take 1 tablet by mouth nightly  Dispense: 90 tablet; Refill: 1    3. Grief  Not improved since dog turned over 1 wk ago. While necessary, had dog x 20 yrs and felt like letting go of a family member. Will be with friends tomorrow for holiday. 4. Mixed hyperlipidemia  Stable, needs statin. Advised to resume now that CoQ10 taken over 2wks. If myalgia resumes, stop statin and contact office. Will send for repatha alternatively. 5. Myalgia due to statin  See #4            Follow-up and Dispositions    Return for VV 4 wk- Trazodone dose incr/counseling fu, RTW update. Specific pt instructions until next visit: call if any problems    Subjective:   Sunil Ruiz is a 70 y.o. female who was seen for Discuss Labs      Pt presents to f/u DEON, grief, RTW status, and lab review. Had to reliquinsh dog recently, due to care and trouble with bodyaches. Taking CoQ10 and other meds. Denies side effects from medication. Feels leg pain has resolved since stopping statin. Has EAP counseling, but last seen ~ 1 month ago. Asked to extend time out of work until 1/12/24 to manage holidays and improve mood.        11/22/2023    12:00 PM

## 2023-11-22 NOTE — PROGRESS NOTES
Chief Complaint   Patient presents with    Discuss Labs     1. Have you been to the ER, urgent care clinic since your last visit? Hospitalized since your last visit? NO    2. Have you seen or consulted any other health care providers outside of the [address] [place] [address] since your last visit? Include any pap smears or colon screening.  NO

## 2023-12-27 ENCOUNTER — TELEMEDICINE (OUTPATIENT)
Facility: CLINIC | Age: 63
End: 2023-12-27
Payer: COMMERCIAL

## 2023-12-27 DIAGNOSIS — F43.21 COMPLICATED GRIEF: ICD-10-CM

## 2023-12-27 DIAGNOSIS — F32.9 REACTIVE DEPRESSION: Primary | ICD-10-CM

## 2023-12-27 DIAGNOSIS — F51.04 PSYCHOPHYSIOLOGICAL INSOMNIA: ICD-10-CM

## 2023-12-27 DIAGNOSIS — R45.851 SUICIDAL THOUGHTS: ICD-10-CM

## 2023-12-27 PROCEDURE — 99214 OFFICE O/P EST MOD 30 MIN: CPT | Performed by: NURSE PRACTITIONER

## 2023-12-27 PROCEDURE — 1124F ACP DISCUSS-NO DSCNMKR DOCD: CPT | Performed by: NURSE PRACTITIONER

## 2023-12-27 NOTE — PROGRESS NOTES
Pt is here for   Chief Complaint   Patient presents with    Follow-up     Trazodone increase, RTW     1. Have you been to the ER, urgent care clinic since your last visit? Hospitalized since your last visit? No    2. Have you seen or consulted any other health care providers outside of the [address] [place] [address] since your last visit? Include any pap smears or colon screening.  No

## 2023-12-27 NOTE — PROGRESS NOTES
Mishel Mckeon is a 70 y.o. female Established patient, here for evaluation of the following chief complaint(s):   Follow-up (Trazodone increase, RTW)          Assessment & Plan:   Below is the assessment and plan developed based on review of pertinent history, physical exam, labs, studies, and medications. 1. Reactive depression  Worse, expected with holidays. No med changes today since circumstances worsened condition. 2. Psychophysiological insomnia  Worse, related to altered mood with holiday also. 3. Complicated grief  See #1 &2    4. Suicidal thoughts  New, will monitor. Has sooner fu on 1/10 before returning to work. Follow-up and Dispositions    Return in about 3 weeks (around 1/17/2024) for VV- RTW, MDD/SI/Grief fu. Needs VV with Jono ASAP also for SI and worsening depression. Specific pt instructions until next visit: call if any problems    Subjective:   Mishel Mckeon is a 70 y.o. female who was seen for Follow-up (Trazodone increase, RTW)      Pt presents to f/u insomnia and mood from grief after losing spouse. Time off from work extended to 1/12/24 last visit. Trazodone increased to 100 mg nightly. Taking trazodone. Denies side effects from medication. Feels sad with holiday, it was tough. Friends took her out during the day, but sad at night. No acute complaints otherwise. 12/27/2023    11:04 AM 11/22/2023    11:15 AM 10/31/2023     3:44 PM   PHQ-9    Little interest or pleasure in doing things 1 1 0   Feeling down, depressed, or hopeless 2 2 2   Trouble falling or staying asleep, or sleeping too much 1 1 2   Feeling tired or having little energy 3 3 2   Poor appetite or overeating 1 1 0   Feeling bad about yourself - or that you are a failure or have let yourself or your family down 1 1 0   Trouble concentrating on things, such as reading the newspaper or watching television 1 1 1   Moving or speaking so slowly that other people could have noticed.  Or the

## 2024-07-22 ENCOUNTER — TELEPHONE (OUTPATIENT)
Facility: CLINIC | Age: 64
End: 2024-07-22

## 2024-07-22 NOTE — TELEPHONE ENCOUNTER
Lm for the pt to give us a call and schedule appt. ER follow up/patient first visit/ Last seen in the office in 2023

## 2024-11-25 ENCOUNTER — TELEMEDICINE (OUTPATIENT)
Facility: CLINIC | Age: 64
End: 2024-11-25
Payer: COMMERCIAL

## 2024-11-25 DIAGNOSIS — I10 ESSENTIAL (PRIMARY) HYPERTENSION: ICD-10-CM

## 2024-11-25 DIAGNOSIS — F32.9 REACTIVE DEPRESSION: ICD-10-CM

## 2024-11-25 DIAGNOSIS — Z12.31 ENCOUNTER FOR SCREENING MAMMOGRAM FOR MALIGNANT NEOPLASM OF BREAST: ICD-10-CM

## 2024-11-25 DIAGNOSIS — D50.8 IRON DEFICIENCY ANEMIA SECONDARY TO INADEQUATE DIETARY IRON INTAKE: ICD-10-CM

## 2024-11-25 DIAGNOSIS — E78.2 MIXED HYPERLIPIDEMIA: Primary | ICD-10-CM

## 2024-11-25 PROCEDURE — G8484 FLU IMMUNIZE NO ADMIN: HCPCS | Performed by: NURSE PRACTITIONER

## 2024-11-25 PROCEDURE — 3017F COLORECTAL CA SCREEN DOC REV: CPT | Performed by: NURSE PRACTITIONER

## 2024-11-25 PROCEDURE — G8400 PT W/DXA NO RESULTS DOC: HCPCS | Performed by: NURSE PRACTITIONER

## 2024-11-25 PROCEDURE — G8421 BMI NOT CALCULATED: HCPCS | Performed by: NURSE PRACTITIONER

## 2024-11-25 PROCEDURE — 99214 OFFICE O/P EST MOD 30 MIN: CPT | Performed by: NURSE PRACTITIONER

## 2024-11-25 PROCEDURE — 1124F ACP DISCUSS-NO DSCNMKR DOCD: CPT | Performed by: NURSE PRACTITIONER

## 2024-11-25 PROCEDURE — 1090F PRES/ABSN URINE INCON ASSESS: CPT | Performed by: NURSE PRACTITIONER

## 2024-11-25 PROCEDURE — G8427 DOCREV CUR MEDS BY ELIG CLIN: HCPCS | Performed by: NURSE PRACTITIONER

## 2024-11-25 PROCEDURE — 1036F TOBACCO NON-USER: CPT | Performed by: NURSE PRACTITIONER

## 2024-11-25 SDOH — ECONOMIC STABILITY: FOOD INSECURITY: WITHIN THE PAST 12 MONTHS, YOU WORRIED THAT YOUR FOOD WOULD RUN OUT BEFORE YOU GOT MONEY TO BUY MORE.: SOMETIMES TRUE

## 2024-11-25 SDOH — ECONOMIC STABILITY: FOOD INSECURITY: WITHIN THE PAST 12 MONTHS, THE FOOD YOU BOUGHT JUST DIDN'T LAST AND YOU DIDN'T HAVE MONEY TO GET MORE.: SOMETIMES TRUE

## 2024-11-25 SDOH — ECONOMIC STABILITY: INCOME INSECURITY: HOW HARD IS IT FOR YOU TO PAY FOR THE VERY BASICS LIKE FOOD, HOUSING, MEDICAL CARE, AND HEATING?: SOMEWHAT HARD

## 2024-11-25 ASSESSMENT — PATIENT HEALTH QUESTIONNAIRE - PHQ9
8. MOVING OR SPEAKING SO SLOWLY THAT OTHER PEOPLE COULD HAVE NOTICED. OR THE OPPOSITE, BEING SO FIGETY OR RESTLESS THAT YOU HAVE BEEN MOVING AROUND A LOT MORE THAN USUAL: NOT AT ALL
SUM OF ALL RESPONSES TO PHQ9 QUESTIONS 1 & 2: 2
1. LITTLE INTEREST OR PLEASURE IN DOING THINGS: SEVERAL DAYS
2. FEELING DOWN, DEPRESSED OR HOPELESS: SEVERAL DAYS
10. IF YOU CHECKED OFF ANY PROBLEMS, HOW DIFFICULT HAVE THESE PROBLEMS MADE IT FOR YOU TO DO YOUR WORK, TAKE CARE OF THINGS AT HOME, OR GET ALONG WITH OTHER PEOPLE: NOT DIFFICULT AT ALL
3. TROUBLE FALLING OR STAYING ASLEEP: SEVERAL DAYS
4. FEELING TIRED OR HAVING LITTLE ENERGY: SEVERAL DAYS
6. FEELING BAD ABOUT YOURSELF - OR THAT YOU ARE A FAILURE OR HAVE LET YOURSELF OR YOUR FAMILY DOWN: SEVERAL DAYS
7. TROUBLE CONCENTRATING ON THINGS, SUCH AS READING THE NEWSPAPER OR WATCHING TELEVISION: SEVERAL DAYS
9. THOUGHTS THAT YOU WOULD BE BETTER OFF DEAD, OR OF HURTING YOURSELF: NOT AT ALL
SUM OF ALL RESPONSES TO PHQ QUESTIONS 1-9: 6
5. POOR APPETITE OR OVEREATING: NOT AT ALL

## 2024-11-25 NOTE — PATIENT INSTRUCTIONS
[facility] Food Resources*  (Call [facility]/ [facility] if need more resources.)       SNAP (formerly [employer])  What they offer: SNAP is used like cash to buy eligible food items from authorized retailers.  Apply for benefits online: https://www.commonCertiVoxp.[place].gov/  Apply for benefits by phone: [phone] (M-F 8AM - 7PM; Sat 9AM - 12PM)  [place] Hunger Hotline  What they offer:  The [place] Hunger Hotline connects individuals in need of food with a local food pantry or program across [address] [facility] [address] [facility] in [facility].   Website: https://[employer]/store-/ (search zip code)   Hunger Hotline Inquiry Form: https://[employer]/hunger-hotline/  Hunger Hotline Phone Number:  809-975-2500 x [phone] (M-F 9:00AM-4:00PM)    Meals on Wheels  Meals on Wheels is a program that delivers meals to individuals who have no reliable means for maintaining a healthy diet.  Services are provided by area.     [place] Service Area:   [facility], and [place].  [facility], [place], [facility], [place], and [place]  Website:  https://Shopintoit.org/how-we-help/meals-on-wheels/  To Apply:  Ages 18-59:  Apply online: https://Shopintoit.[email or web address]/meals-on-wheels-application-form/  Apply by phone: [phone]          Meals on Wheels  [facility] Area (continued):  To Apply:  Ages 60+:  Apply Online: https://[email or web address]/meals-[email or web address]-wheels-application-form/  Apply By Phone: [phone] (M-F 8:30AM-5PM)  For [facility], [facility], [facility], [place], [place], [place] and [place]: You may also apply by calling [place], [facility] on [phone]  For [facility], and [place] as well as [place], [place] and [place]:    Contact [facility] Agency on [phone]    [place] [facility]

## 2024-11-25 NOTE — PROGRESS NOTES
\"Have you been to the ER, urgent care clinic since your last visit?  Hospitalized since your last visit?\"    NO    “Have you seen or consulted any other health care providers outside our system since your last visit?”    NO    Have you had a mammogram?”   NO    No breast cancer screening on file       “Have you had a colorectal cancer screening such as a colonoscopy/FIT/Cologuard?    NO    No colonoscopy on file  No cologuard on file  Date of last FIT: 6/8/2023   No flexible sigmoidoscopy on file          Chief Complaint   Patient presents with    Check-Up     Pt states need blood work haven't been seen in a while       
irritation noted.   Nose- no obvious drainage or swelling.  Throat- no obvious swelling, goiter, or notable lymphadenopathy  Chest - Symmetric chest rise. No wheezing or coughing. No distress.  Skin- normal skin tone noted. No hyperpigmentation or obvious deformities. No diaphoresis noted. No flushing.  Neuro - Normal speech, no focal findings or movement disorder.     Other pertinent observable physical exam findings:-    We discussed the expected course, resolution and complications of the diagnosis(es) in detail.  Medication risks, benefits, costs, interactions, and alternatives were discussed as indicated.  I advised her to contact the office if her condition worsens, changes or fails to improve as anticipated. She expressed understanding with the diagnosis(es) and plan.     Zeenat Beckford, was evaluated through a synchronous (real-time) audio-video encounter. The patient (or guardian if applicable) is aware that this is a billable service, which includes applicable co-pays. This Virtual Visit was conducted with patient's (and/or legal guardian's) consent. Patient identification was verified, and a caregiver was present when appropriate.   The patient was located at Home: [address] [place] [id number]  Provider was located at Home [place]Appt [place]  Confirm you are appropriately licensed, registered, or certified to deliver care in the state where the patient is located as indicated above. If you are not or unsure, please re-schedule the visit: Yes, I confirm.         An electronic signature was used to authenticate this note.  -- Jenny Lobo NP

## 2024-12-02 DIAGNOSIS — D50.8 IRON DEFICIENCY ANEMIA SECONDARY TO INADEQUATE DIETARY IRON INTAKE: ICD-10-CM

## 2024-12-02 DIAGNOSIS — I10 ESSENTIAL (PRIMARY) HYPERTENSION: ICD-10-CM

## 2024-12-02 DIAGNOSIS — E78.2 MIXED HYPERLIPIDEMIA: ICD-10-CM

## 2024-12-03 DIAGNOSIS — E78.2 MIXED HYPERLIPIDEMIA: Primary | ICD-10-CM

## 2024-12-03 LAB
25(OH)D3 SERPL-MCNC: 44.6 NG/ML (ref 30–100)
ALBUMIN SERPL-MCNC: 4.3 G/DL (ref 3.5–5)
ALBUMIN/GLOB SERPL: 1 (ref 1.1–2.2)
ALP SERPL-CCNC: 55 U/L (ref 45–117)
ALT SERPL-CCNC: 25 U/L (ref 12–78)
ANION GAP SERPL CALC-SCNC: 6 MMOL/L (ref 2–12)
AST SERPL-CCNC: 20 U/L (ref 15–37)
BASOPHILS # BLD: 0.1 K/UL (ref 0–0.1)
BASOPHILS NFR BLD: 1 % (ref 0–1)
BILIRUB SERPL-MCNC: 0.9 MG/DL (ref 0.2–1)
BUN SERPL-MCNC: 12 MG/DL (ref 6–20)
BUN/CREAT SERPL: 13 (ref 12–20)
CALCIUM SERPL-MCNC: 9.2 MG/DL (ref 8.5–10.1)
CHLORIDE SERPL-SCNC: 103 MMOL/L (ref 97–108)
CHOLEST SERPL-MCNC: 307 MG/DL
CO2 SERPL-SCNC: 30 MMOL/L (ref 21–32)
CREAT SERPL-MCNC: 0.94 MG/DL (ref 0.55–1.02)
DIFFERENTIAL METHOD BLD: NORMAL
EOSINOPHIL # BLD: 0.2 K/UL (ref 0–0.4)
EOSINOPHIL NFR BLD: 3 % (ref 0–7)
ERYTHROCYTE [DISTWIDTH] IN BLOOD BY AUTOMATED COUNT: 12.4 % (ref 11.5–14.5)
EST. AVERAGE GLUCOSE BLD GHB EST-MCNC: 123 MG/DL
GLOBULIN SER CALC-MCNC: 4.3 G/DL (ref 2–4)
GLUCOSE SERPL-MCNC: 115 MG/DL (ref 65–100)
HBA1C MFR BLD: 5.9 % (ref 4–5.6)
HCT VFR BLD AUTO: 43.7 % (ref 35–47)
HDLC SERPL-MCNC: 53 MG/DL
HDLC SERPL: 5.8 (ref 0–5)
HGB BLD-MCNC: 14.7 G/DL (ref 11.5–16)
IMM GRANULOCYTES # BLD AUTO: 0 K/UL (ref 0–0.04)
IMM GRANULOCYTES NFR BLD AUTO: 0 % (ref 0–0.5)
LDLC SERPL CALC-MCNC: 194.6 MG/DL (ref 0–100)
LPA SERPL-SCNC: 8.7 NMOL/L
LYMPHOCYTES # BLD: 2.7 K/UL (ref 0.8–3.5)
LYMPHOCYTES NFR BLD: 41 % (ref 12–49)
MCH RBC QN AUTO: 30.6 PG (ref 26–34)
MCHC RBC AUTO-ENTMCNC: 33.6 G/DL (ref 30–36.5)
MCV RBC AUTO: 90.9 FL (ref 80–99)
MONOCYTES # BLD: 0.5 K/UL (ref 0–1)
MONOCYTES NFR BLD: 7 % (ref 5–13)
NEUTS SEG # BLD: 3.2 K/UL (ref 1.8–8)
NEUTS SEG NFR BLD: 48 % (ref 32–75)
NRBC # BLD: 0 K/UL (ref 0–0.01)
NRBC BLD-RTO: 0 PER 100 WBC
PLATELET # BLD AUTO: 266 K/UL (ref 150–400)
PMV BLD AUTO: 9.8 FL (ref 8.9–12.9)
POTASSIUM SERPL-SCNC: 3.5 MMOL/L (ref 3.5–5.1)
PROT SERPL-MCNC: 8.6 G/DL (ref 6.4–8.2)
RBC # BLD AUTO: 4.81 M/UL (ref 3.8–5.2)
SODIUM SERPL-SCNC: 139 MMOL/L (ref 136–145)
TRIGL SERPL-MCNC: 297 MG/DL
VLDLC SERPL CALC-MCNC: 59.4 MG/DL
WBC # BLD AUTO: 6.7 K/UL (ref 3.6–11)

## 2024-12-03 RX ORDER — EZETIMIBE 10 MG/1
10 TABLET ORAL DAILY
Qty: 90 TABLET | Refills: 3 | Status: SHIPPED | OUTPATIENT
Start: 2024-12-03

## 2024-12-03 RX ORDER — ROSUVASTATIN CALCIUM 40 MG/1
40 TABLET, COATED ORAL DAILY
Qty: 90 TABLET | Refills: 3 | Status: SHIPPED | OUTPATIENT
Start: 2024-12-03

## 2024-12-30 ENCOUNTER — OFFICE VISIT (OUTPATIENT)
Facility: CLINIC | Age: 64
End: 2024-12-30

## 2024-12-30 VITALS
HEART RATE: 73 BPM | WEIGHT: 139 LBS | HEIGHT: 64 IN | SYSTOLIC BLOOD PRESSURE: 126 MMHG | OXYGEN SATURATION: 97 % | RESPIRATION RATE: 16 BRPM | BODY MASS INDEX: 23.73 KG/M2 | DIASTOLIC BLOOD PRESSURE: 62 MMHG | TEMPERATURE: 98 F

## 2024-12-30 DIAGNOSIS — E78.2 MIXED HYPERLIPIDEMIA: ICD-10-CM

## 2024-12-30 DIAGNOSIS — Z00.00 MEDICARE ANNUAL WELLNESS VISIT, SUBSEQUENT: ICD-10-CM

## 2024-12-30 DIAGNOSIS — T46.6X5A MYALGIA DUE TO STATIN: ICD-10-CM

## 2024-12-30 DIAGNOSIS — M79.10 MYALGIA DUE TO STATIN: ICD-10-CM

## 2024-12-30 DIAGNOSIS — D50.8 IRON DEFICIENCY ANEMIA SECONDARY TO INADEQUATE DIETARY IRON INTAKE: ICD-10-CM

## 2024-12-30 DIAGNOSIS — I10 ESSENTIAL (PRIMARY) HYPERTENSION: Primary | ICD-10-CM

## 2024-12-30 RX ORDER — AMLODIPINE BESYLATE 5 MG/1
5 TABLET ORAL DAILY
Qty: 90 TABLET | Refills: 3 | Status: SHIPPED | OUTPATIENT
Start: 2024-12-30

## 2024-12-30 RX ORDER — AMLODIPINE BESYLATE 5 MG/1
5 TABLET ORAL DAILY
COMMUNITY
Start: 2024-12-20 | End: 2024-12-30 | Stop reason: SDUPTHER

## 2024-12-30 RX ORDER — UBIDECARENONE 200 MG
200 CAPSULE ORAL DAILY
Qty: 90 CAPSULE | Refills: 3 | Status: SHIPPED | OUTPATIENT
Start: 2024-12-30

## 2024-12-30 ASSESSMENT — PATIENT HEALTH QUESTIONNAIRE - PHQ9
SUM OF ALL RESPONSES TO PHQ QUESTIONS 1-9: 4
5. POOR APPETITE OR OVEREATING: NOT AT ALL
SUM OF ALL RESPONSES TO PHQ9 QUESTIONS 1 & 2: 2
10. IF YOU CHECKED OFF ANY PROBLEMS, HOW DIFFICULT HAVE THESE PROBLEMS MADE IT FOR YOU TO DO YOUR WORK, TAKE CARE OF THINGS AT HOME, OR GET ALONG WITH OTHER PEOPLE: NOT DIFFICULT AT ALL
2. FEELING DOWN, DEPRESSED OR HOPELESS: SEVERAL DAYS
SUM OF ALL RESPONSES TO PHQ QUESTIONS 1-9: 4
1. LITTLE INTEREST OR PLEASURE IN DOING THINGS: SEVERAL DAYS
4. FEELING TIRED OR HAVING LITTLE ENERGY: SEVERAL DAYS
SUM OF ALL RESPONSES TO PHQ QUESTIONS 1-9: 4
8. MOVING OR SPEAKING SO SLOWLY THAT OTHER PEOPLE COULD HAVE NOTICED. OR THE OPPOSITE, BEING SO FIGETY OR RESTLESS THAT YOU HAVE BEEN MOVING AROUND A LOT MORE THAN USUAL: NOT AT ALL
3. TROUBLE FALLING OR STAYING ASLEEP: SEVERAL DAYS
SUM OF ALL RESPONSES TO PHQ QUESTIONS 1-9: 4
9. THOUGHTS THAT YOU WOULD BE BETTER OFF DEAD, OR OF HURTING YOURSELF: NOT AT ALL
6. FEELING BAD ABOUT YOURSELF - OR THAT YOU ARE A FAILURE OR HAVE LET YOURSELF OR YOUR FAMILY DOWN: NOT AT ALL
7. TROUBLE CONCENTRATING ON THINGS, SUCH AS READING THE NEWSPAPER OR WATCHING TELEVISION: NOT AT ALL

## 2024-12-30 ASSESSMENT — LIFESTYLE VARIABLES
HOW MANY STANDARD DRINKS CONTAINING ALCOHOL DO YOU HAVE ON A TYPICAL DAY: PATIENT DOES NOT DRINK
HOW OFTEN DO YOU HAVE A DRINK CONTAINING ALCOHOL: NEVER

## 2024-12-30 NOTE — PATIENT INSTRUCTIONS
vaping. If you need help quitting, talk to your doctor about stop-smoking programs and medicines. These can increase your chances of quitting for good. Quitting is one of the most important things you can do to protect your heart. It is never too late to quit. Try to avoid secondhand smoke too.     Stay at a weight that's healthy for you. Talk to your doctor if you need help losing weight.     Try to get 7 to 9 hours of sleep each night.     Limit alcohol to 2 drinks a day for men and 1 drink a day for women. Too much alcohol can cause health problems.     Manage other health problems such as diabetes, high blood pressure, and high cholesterol. If you think you may have a problem with alcohol or drug use, talk to your doctor.   Medicines    Take your medicines exactly as prescribed. Call your doctor if you think you are having a problem with your medicine.     If your doctor recommends aspirin, take the amount directed each day. Make sure you take aspirin and not another kind of pain reliever, such as acetaminophen (Tylenol).   When should you call for help?   Call 911 if you have symptoms of a heart attack. These may include:    Chest pain or pressure, or a strange feeling in the chest.     Sweating.     Shortness of breath.     Pain, pressure, or a strange feeling in the back, neck, jaw, or upper belly or in one or both shoulders or arms.     Lightheadedness or sudden weakness.     A fast or irregular heartbeat.   After you call 911, the  may tell you to chew 1 adult-strength or 2 to 4 low-dose aspirin. Wait for an ambulance. Do not try to drive yourself.  Watch closely for changes in your health, and be sure to contact your doctor if you have any problems.  Where can you learn more?  Go to https://www.[employer].net/patientEd and enter F075 to learn more about \"A Healthy Heart: Care Instructions.\"  Current as of: June 24, 2023  Content Version: 14.2  © 2024 [employer].   Care instructions

## 2024-12-30 NOTE — PROGRESS NOTES
Pt is here for   Chief Complaint   Patient presents with    Follow-up     HTN, Lab rev     \"Have you been to the ER, urgent care clinic since your last visit?  Hospitalized since your last visit?\"    NO    “Have you seen or consulted any other health care providers outside our system since your last visit?”    NO    Have you had a mammogram?”   NO    No breast cancer screening on file       “Have you had a colorectal cancer screening such as a colonoscopy/FIT/Cologuard?    NO    No colonoscopy on file  No cologuard on file  Date of last FIT: 6/8/2023   No flexible sigmoidoscopy on file           
59.4 12/02/2024    CHOLHDLRATIO 5.8 (H) 12/02/2024           Patient's complete Health Risk Assessment and screening values have been reviewed and are found in Flowsheets. The following problems were reviewed today and where indicated follow up appointments were made and/or referrals ordered.    Positive Risk Factor Screenings with Interventions:                      Advanced Directives:  Do you have a Living Will?: (!) No    Intervention:  has NO advanced directive - not interested in additional information                     Objective   Vitals:    12/30/24 1421   BP: 126/62   Site: Right Upper Arm   Position: Sitting   Cuff Size: Large Adult   Pulse: 73   Resp: 16   Temp: 98 °F (36.7 °C)   TempSrc: Temporal   SpO2: 97%   Weight: 63 kg (139 lb)   Height: 1.626 m (5' 4\")      Body mass index is 23.86 kg/m².        Physical Exam:   General appearance - alert, well appearing, and in no distress.  Mental status - A/O x 4,normal mood and affect.   Chest - CTA.Symmetric chest rise. No wheezing. No distress.  Heart - Normal rate & rhythm. Normal S1 & S2. No MGR.   Abdomen- Soft, round. Non-distended, NT. No pulsatile masses or hernias.  Ext-  No pedal edema, clubbing, or cyanosis.  Skin-Warm and dry. No hyperpigmentation, ulcerations, or suspicious lesions.  Neuro - Normal speech, no focal findings or movement disorder. Normal strength, gait, and muscle tone.             No Known Allergies  Prior to Visit Medications    Medication Sig Taking? Authorizing Provider   amLODIPine (NORVASC) 5 MG tablet Take 1 tablet by mouth daily Yes Yamil, Jenny I, APRN - NP   Coenzyme Q-10 200 MG CAPS Take 200 mg by mouth daily Take WITH statin (Crestor, Zocor, or Lipitor). Yes Yamil, Jenny I, APRN - NP   rosuvastatin (CRESTOR) 40 MG tablet Take 1 tablet by mouth daily Yes Yamil, Jenny I, APRN - NP   ezetimibe (ZETIA) 10 MG tablet Take 1 tablet by mouth daily Take with your STATIN Yes Yamil, Jenny I, APRN - NP   magnesium 30 MG tablet

## 2025-04-23 ENCOUNTER — TELEMEDICINE (OUTPATIENT)
Facility: CLINIC | Age: 65
End: 2025-04-23
Payer: MEDICARE

## 2025-04-23 DIAGNOSIS — I10 ESSENTIAL (PRIMARY) HYPERTENSION: Primary | ICD-10-CM

## 2025-04-23 DIAGNOSIS — R25.2 LEG CRAMPING: ICD-10-CM

## 2025-04-23 DIAGNOSIS — E78.2 MIXED HYPERLIPIDEMIA: ICD-10-CM

## 2025-04-23 DIAGNOSIS — M21.611 BUNION, RIGHT FOOT: ICD-10-CM

## 2025-04-23 DIAGNOSIS — R10.9 LEFT FLANK PAIN: ICD-10-CM

## 2025-04-23 DIAGNOSIS — M25.552 LEFT HIP PAIN: ICD-10-CM

## 2025-04-23 PROCEDURE — 99214 OFFICE O/P EST MOD 30 MIN: CPT | Performed by: NURSE PRACTITIONER

## 2025-04-23 SDOH — ECONOMIC STABILITY: FOOD INSECURITY: WITHIN THE PAST 12 MONTHS, THE FOOD YOU BOUGHT JUST DIDN'T LAST AND YOU DIDN'T HAVE MONEY TO GET MORE.: NEVER TRUE

## 2025-04-23 SDOH — ECONOMIC STABILITY: FOOD INSECURITY: WITHIN THE PAST 12 MONTHS, YOU WORRIED THAT YOUR FOOD WOULD RUN OUT BEFORE YOU GOT MONEY TO BUY MORE.: NEVER TRUE

## 2025-04-23 ASSESSMENT — PATIENT HEALTH QUESTIONNAIRE - PHQ9
SUM OF ALL RESPONSES TO PHQ QUESTIONS 1-9: 4
3. TROUBLE FALLING OR STAYING ASLEEP: SEVERAL DAYS
9. THOUGHTS THAT YOU WOULD BE BETTER OFF DEAD, OR OF HURTING YOURSELF: NOT AT ALL
SUM OF ALL RESPONSES TO PHQ QUESTIONS 1-9: 4
5. POOR APPETITE OR OVEREATING: NOT AT ALL
8. MOVING OR SPEAKING SO SLOWLY THAT OTHER PEOPLE COULD HAVE NOTICED. OR THE OPPOSITE, BEING SO FIGETY OR RESTLESS THAT YOU HAVE BEEN MOVING AROUND A LOT MORE THAN USUAL: NOT AT ALL
10. IF YOU CHECKED OFF ANY PROBLEMS, HOW DIFFICULT HAVE THESE PROBLEMS MADE IT FOR YOU TO DO YOUR WORK, TAKE CARE OF THINGS AT HOME, OR GET ALONG WITH OTHER PEOPLE: SOMEWHAT DIFFICULT
4. FEELING TIRED OR HAVING LITTLE ENERGY: SEVERAL DAYS
2. FEELING DOWN, DEPRESSED OR HOPELESS: SEVERAL DAYS
7. TROUBLE CONCENTRATING ON THINGS, SUCH AS READING THE NEWSPAPER OR WATCHING TELEVISION: NOT AT ALL
1. LITTLE INTEREST OR PLEASURE IN DOING THINGS: SEVERAL DAYS
6. FEELING BAD ABOUT YOURSELF - OR THAT YOU ARE A FAILURE OR HAVE LET YOURSELF OR YOUR FAMILY DOWN: NOT AT ALL
SUM OF ALL RESPONSES TO PHQ QUESTIONS 1-9: 4
SUM OF ALL RESPONSES TO PHQ QUESTIONS 1-9: 4

## 2025-04-23 NOTE — PATIENT INSTRUCTIONS
Look for an AUTOMATED BP cuff of AMAZON. Start checking at least THREE TIMES WEEKLY. The goal reading is UNDER 140/90.    Call Dr. Knowles:  [phone]

## 2025-04-23 NOTE — PROGRESS NOTES
Pt is here for   Chief Complaint   Patient presents with    Follow-up     HTN/HLDrepeat CHOL.     \"Have you been to the ER, urgent care clinic since your last visit?  Hospitalized since your last visit?\"    NO    “Have you seen or consulted any other health care providers outside our system since your last visit?”    NO    Have you had a mammogram?”   NO    No breast cancer screening on file       “Have you had a colorectal cancer screening such as a colonoscopy/FIT/Cologuard?    NO    No colonoscopy on file  No cologuard on file  Date of last FIT: 6/8/2023   No flexible sigmoidoscopy on file

## 2025-04-23 NOTE — PROGRESS NOTES
Zeenat Beckford is a 65 y.o. female Established patient, here for evaluation of the following chief complaint(s):   Follow-up (HTN/HLDrepeat CHOL.)      The patient (or guardian, if applicable) and other individuals in attendance with the patient were advised that Artificial Intelligence will be utilized during this visit to record, process the conversation to generate a clinical note, and support improvement of the AI technology. The patient (or guardian, if applicable) and other individuals in attendance at the appointment consented to the use of AI, including the recording. There may be incorrect pronoun references transcribed based on AI determining this from the recorded conversation.     Assessment & Plan:   Below is the assessment and plan developed based on review of pertinent history, physical exam, labs, studies, and medications.       Diagnosis Orders   1. Essential (primary) hypertension        2. Mixed hyperlipidemia  Lipid Panel      3. Bunion, right foot  AFL - Eleni, MIGUEL ANGEL Rizo, Podiatry, [place] ([address])              PLAN:   Assessment & Plan  1. Hypertension.  His blood pressure readings at work have been elevated, around 148/90, despite taking amlodipine at night. In-office readings are good, suggesting external factors like stress and diet may be influencing his work readings.  He is advised to purchase an automated blood pressure cuff for home monitoring.  Instructions for setting up [employer] were provided to ensure he can access his after-visit summary.  Follow-up in 3 months to reassess blood pressure control.    2. Hypercholesterolemia.  He has been experiencing intense body pain and cramps, which may be related to his medication regimen. He is currently taking rosuvastatin and coenzyme Q10.  Labs will be repeated to assess his cholesterol levels since starting rosuvastatin.  He is advised to fast before the lab work, scheduled for Friday.  Follow-up in 3 months to review lab results